# Patient Record
Sex: FEMALE | Race: WHITE | Employment: FULL TIME | URBAN - METROPOLITAN AREA
[De-identification: names, ages, dates, MRNs, and addresses within clinical notes are randomized per-mention and may not be internally consistent; named-entity substitution may affect disease eponyms.]

---

## 2021-10-21 NOTE — PROGRESS NOTES
polyuria. Genitourinary: Negative for dysuria, frequency, vaginal bleeding and vaginal discharge. Musculoskeletal: Negative for arthralgias, back pain, joint swelling, myalgias and neck pain. Skin: Negative for rash and wound. Allergic/Immunologic: Negative for environmental allergies. Neurological: Negative for dizziness, tremors, syncope, weakness, light-headedness, numbness and headaches. Hematological: Negative for adenopathy. Psychiatric/Behavioral: Negative for behavioral problems, decreased concentration, sleep disturbance and suicidal ideas. The patient is not nervous/anxious. History reviewed. No pertinent past medical history. Family History   Problem Relation Age of Onset    Heart Failure Father        Social History     Tobacco Use    Smoking status: Never Smoker    Smokeless tobacco: Never Used   Substance Use Topics    Alcohol use: Not on file    Drug use: Not on file       New Prescriptions    No medications on file       Meds Prior to visit:  No current outpatient medications on file prior to visit. No current facility-administered medications on file prior to visit. No Known Allergies    OBJECTIVE:  BP (!) 148/93   Pulse 121   Temp 97.9 °F (36.6 °C) (Temporal)   Resp 16   Ht 5' 6\" (1.676 m)   Wt 173 lb 9.6 oz (78.7 kg)   LMP 09/22/2021   Breastfeeding No   BMI 28.02 kg/m²   BP Readings from Last 2 Encounters:   10/22/21 (!) 148/93     Wt Readings from Last 3 Encounters:   10/22/21 173 lb 9.6 oz (78.7 kg)       Physical Exam  Vitals reviewed. Constitutional:       General: She is not in acute distress. Appearance: Normal appearance. She is well-developed and normal weight. She is not ill-appearing. HENT:      Head: Normocephalic and atraumatic. Right Ear: Tympanic membrane, ear canal and external ear normal. There is no impacted cerumen. Left Ear: Tympanic membrane, ear canal and external ear normal. There is no impacted cerumen. Nose: Nose normal.      Mouth/Throat:      Mouth: Mucous membranes are moist.      Pharynx: Oropharynx is clear. No oropharyngeal exudate or posterior oropharyngeal erythema. Eyes:      General: No scleral icterus. Right eye: No discharge. Left eye: No discharge. Extraocular Movements: Extraocular movements intact. Conjunctiva/sclera: Conjunctivae normal.      Pupils: Pupils are equal, round, and reactive to light. Cardiovascular:      Rate and Rhythm: Normal rate and regular rhythm. Pulses: Normal pulses. Heart sounds: Normal heart sounds. No murmur heard. Comments: Radial and pedal pulses intact  Pulmonary:      Effort: Pulmonary effort is normal. No respiratory distress. Breath sounds: Normal breath sounds. No wheezing or rales. Chest:      Chest wall: No tenderness. Abdominal:      General: Bowel sounds are normal. There is no distension. Palpations: Abdomen is soft. There is no mass. Tenderness: There is no abdominal tenderness. There is no guarding or rebound. Hernia: No hernia is present. Comments: Normal liver and spleen. No organomegaly   Musculoskeletal:         General: No tenderness. Normal range of motion. Cervical back: Normal range of motion and neck supple. Comments: Intact in all extremities   Lymphadenopathy:      Cervical: No cervical adenopathy. Skin:     General: Skin is warm. Findings: No erythema or rash. Neurological:      General: No focal deficit present. Mental Status: She is alert and oriented to person, place, and time. Mental status is at baseline. Sensory: No sensory deficit. Motor: No weakness or abnormal muscle tone. Coordination: Coordination normal.      Gait: Gait normal.      Deep Tendon Reflexes: Reflexes normal.   Psychiatric:         Mood and Affect: Mood normal.         Behavior: Behavior normal.         Thought Content:  Thought content normal.         Judgment: Judgment normal.           Discussed use, benefit, and side effects of prescribed medications. Barriers to medication compliance addressed. All patient questions answered. Pt voiced understanding. RTC Return in about 1 year (around 10/22/2022).     Future Appointments   Date Time Provider Frida Loredo   12/9/2021  3:30 PM [facility] NIKOLASY AARON Ha MD  10/22/2021  2:31 PM

## 2021-10-22 ENCOUNTER — OFFICE VISIT (OUTPATIENT)
Dept: PRIMARY CARE CLINIC | Age: 31
End: 2021-10-22
Payer: COMMERCIAL

## 2021-10-22 VITALS
HEIGHT: 66 IN | DIASTOLIC BLOOD PRESSURE: 93 MMHG | WEIGHT: 173.6 LBS | BODY MASS INDEX: 27.9 KG/M2 | SYSTOLIC BLOOD PRESSURE: 148 MMHG | HEART RATE: 121 BPM | TEMPERATURE: 97.9 F | RESPIRATION RATE: 16 BRPM

## 2021-10-22 DIAGNOSIS — Z00.00 PHYSICAL EXAM: Primary | ICD-10-CM

## 2021-10-22 DIAGNOSIS — M54.31 RIGHT SCIATIC NERVE PAIN: ICD-10-CM

## 2021-10-22 DIAGNOSIS — Z12.4 CERVICAL CANCER SCREENING: ICD-10-CM

## 2021-10-22 DIAGNOSIS — Z00.00 PHYSICAL EXAM: ICD-10-CM

## 2021-10-22 LAB
A/G RATIO: 1.8 (ref 1.1–2.2)
ALBUMIN SERPL-MCNC: 4.6 G/DL (ref 3.4–5)
ALP BLD-CCNC: 48 U/L (ref 40–129)
ALT SERPL-CCNC: 10 U/L (ref 10–40)
ANION GAP SERPL CALCULATED.3IONS-SCNC: 14 MMOL/L (ref 3–16)
AST SERPL-CCNC: 20 U/L (ref 15–37)
BASOPHILS ABSOLUTE: 0.2 K/UL (ref 0–0.2)
BASOPHILS RELATIVE PERCENT: 2.2 %
BILIRUB SERPL-MCNC: 0.6 MG/DL (ref 0–1)
BUN BLDV-MCNC: 17 MG/DL (ref 7–20)
CALCIUM SERPL-MCNC: 9.3 MG/DL (ref 8.3–10.6)
CHLORIDE BLD-SCNC: 102 MMOL/L (ref 99–110)
CO2: 22 MMOL/L (ref 21–32)
CREAT SERPL-MCNC: 0.9 MG/DL (ref 0.6–1.1)
EOSINOPHILS ABSOLUTE: 0.6 K/UL (ref 0–0.6)
EOSINOPHILS RELATIVE PERCENT: 8.4 %
GFR AFRICAN AMERICAN: >60
GFR NON-AFRICAN AMERICAN: >60
GLOBULIN: 2.5 G/DL
GLUCOSE BLD-MCNC: 80 MG/DL (ref 70–99)
HCT VFR BLD CALC: 40.5 % (ref 36–48)
HEMOGLOBIN: 13.6 G/DL (ref 12–16)
LYMPHOCYTES ABSOLUTE: 1.5 K/UL (ref 1–5.1)
LYMPHOCYTES RELATIVE PERCENT: 19.9 %
MCH RBC QN AUTO: 30.6 PG (ref 26–34)
MCHC RBC AUTO-ENTMCNC: 33.6 G/DL (ref 31–36)
MCV RBC AUTO: 91 FL (ref 80–100)
MONOCYTES ABSOLUTE: 0.4 K/UL (ref 0–1.3)
MONOCYTES RELATIVE PERCENT: 5.4 %
NEUTROPHILS ABSOLUTE: 4.9 K/UL (ref 1.7–7.7)
NEUTROPHILS RELATIVE PERCENT: 64.1 %
PDW BLD-RTO: 13.1 % (ref 12.4–15.4)
PLATELET # BLD: 209 K/UL (ref 135–450)
PMV BLD AUTO: 10.6 FL (ref 5–10.5)
POTASSIUM SERPL-SCNC: 3.9 MMOL/L (ref 3.5–5.1)
RBC # BLD: 4.45 M/UL (ref 4–5.2)
SODIUM BLD-SCNC: 138 MMOL/L (ref 136–145)
TOTAL PROTEIN: 7.1 G/DL (ref 6.4–8.2)
WBC # BLD: 7.7 K/UL (ref 4–11)

## 2021-10-22 PROCEDURE — 99202 OFFICE O/P NEW SF 15 MIN: CPT | Performed by: FAMILY MEDICINE

## 2021-10-22 PROCEDURE — 99385 PREV VISIT NEW AGE 18-39: CPT | Performed by: FAMILY MEDICINE

## 2021-10-22 ASSESSMENT — ENCOUNTER SYMPTOMS
CONSTIPATION: 0
DIARRHEA: 0
SHORTNESS OF BREATH: 0
SINUS PAIN: 0
NAUSEA: 0
COUGH: 0
VOMITING: 0
BLOOD IN STOOL: 0
RHINORRHEA: 0
WHEEZING: 0
BACK PAIN: 0
ABDOMINAL PAIN: 0
CHEST TIGHTNESS: 0
SORE THROAT: 0
SINUS PRESSURE: 0

## 2021-10-22 ASSESSMENT — PATIENT HEALTH QUESTIONNAIRE - PHQ9
SUM OF ALL RESPONSES TO PHQ QUESTIONS 1-9: 0
SUM OF ALL RESPONSES TO PHQ QUESTIONS 1-9: 0
2. FEELING DOWN, DEPRESSED OR HOPELESS: 0
SUM OF ALL RESPONSES TO PHQ9 QUESTIONS 1 & 2: 0
1. LITTLE INTEREST OR PLEASURE IN DOING THINGS: 0
SUM OF ALL RESPONSES TO PHQ QUESTIONS 1-9: 0

## 2021-10-27 ENCOUNTER — HOSPITAL ENCOUNTER (OUTPATIENT)
Dept: PHYSICAL THERAPY | Age: 31
Setting detail: THERAPIES SERIES
Discharge: HOME | End: 2021-10-27
Payer: COMMERCIAL

## 2021-10-27 PROCEDURE — 97161 PT EVAL LOW COMPLEX 20 MIN: CPT | Performed by: PHYSICAL THERAPIST

## 2021-10-27 PROCEDURE — 97110 THERAPEUTIC EXERCISES: CPT | Performed by: PHYSICAL THERAPIST

## 2021-10-27 NOTE — FLOWSHEET NOTE
[facility] [address] [place], [place]    Physical Therapy Treatment Note/ Progress Report:           Date:  10/27/2021    Patient Name:  Sonny Maria    :  1990  MRN: [id number]  Restrictions/Precautions:    Medical/Treatment Diagnosis Information:  · Diagnosis: Right sciatic nerve pain (M54.31)  · Treatment Diagnosis: Low back pain (X33.4)  Insurance/Certification information:  PT Insurance Information: [facility]  Physician Information:  Referring Practitioner: Doreen Fraser  Has the plan of care been signed (Y/N):        []  Yes  [x]  No     Date of Patient follow up with Physician:       Is this a Progress Report:     []  Yes  [x]  No        If Yes:  Date Range for reporting period:  Beginning: 10/27/2021  Ending    Progress report will be due (10 Rx or 30 days whichever is less):        Recertification will be due (POC Duration  / 90 days whichever is less):         Visit # Insurance Allowable Auth Required   In-person 1  []  Yes []  No    Telehealth   []  Yes []  No    Total            Functional Scale: Modified Oswestry 850 (16%)    Date assessed:  10/27/2021      Therapy Diagnosis/Practice Pattern:F      Number of Comorbidities:  [x]0     []1-2    []3+    Latex Allergy:  [x]NO      []YES  Preferred Language for Healthcare:   [x]English       []other:      Pain level:  3-8/10    SUBJECTIVE:  See eval    OBJECTIVE: See eval   Observation:    Test measurements:      RESTRICTIONS/PRECAUTIONS:     Exercises/Interventions:     Therapeutic Ex (34261) Sets/sec Reps Notes/CUES   Pete pose to arGEN-X" 5x ea    Supine outer hip stretch 30\" 3    Figure 4 stretch 30\" 3    HL TA with BKFO 3\" 5x ea  alternating   SLR abd 2 10    Clamshells 3\" 20x    Bridging with ABD 3\" 20x                Patient ed 10'  HEP, POC, ice vs heat, self massage with tennis ball or foam roller, dry needling, posture                           Manual Intervention (75705)      Lumbopelvic roll 3'                                   NMR re-education (70431)   CUES NEEDED                                                         Therapeutic Activity (57614)                                          HEP instruction:   Access Code: [email or web address]  URL: [email or web address]  Date: 10/27/2021  Prepared by: Rimma Gleason    Therapeutic Exercise and NMR EXR  [x] (72438) Provided verbal/tactile cueing for activities related to strengthening, flexibility, endurance, ROM  for improvements in proximal hip and core control with self care, mobility, lifting and ambulation.  [] (61196) Provided verbal/tactile cueing for activities related to improving balance, coordination, kinesthetic sense, posture, motor skill, proprioception  to assist with core control in self care, mobility, lifting, and ambulation.      Therapeutic Activities:    [] (44246 or 79513) Provided verbal/tactile cueing for activities related to improving balance, coordination, kinesthetic sense, posture, motor skill, proprioception and motor activation to allow for proper function  with self care and ADLs  [] (21504) Provided training and instruction to the patient for proper core and proximal hip recruitment and positioning with ambulation re-education     Home Exercise Program:    [x] (24701) Reviewed/Progressed HEP activities related to strengthening, flexibility, endurance, ROM of core, proximal hip and LE for functional self-care, mobility, lifting and ambulation   [] (14548) Reviewed/Progressed HEP activities related to improving balance, coordination, kinesthetic sense, posture, motor skill, proprioception of core, proximal hip and LE for self care, mobility, lifting, and ambulation      Manual Treatments:  PROM / STM / Oscillations-Mobs:  G-I, II, III, IV (PA's, Inf., Post.)  [x] (14360) Provided manual therapy to mobilize proximal hip and LS spine soft tissue/joints for the purpose of modulating pain, promoting relaxation,  increasing ROM, reducing/eliminating soft tissue swelling/inflammation/restriction, improving soft tissue extensibility and allowing for proper ROM for normal function with self care, mobility, lifting and ambulation. Modalities:   None this date    Charges  Timed Code Treatment Minutes: 23'   Total Treatment Minutes: 36'       [x] EVAL (LOW) 05094   [] EVAL (MOD) 83856   [] EVAL (HIGH) 51244   [] RE-EVAL     [x] YC(48113) x  2   [] IONTO  [] NMR (71555) x     [] VASO  [] Manual (95636) x      [] Other:  [] TA x      [] Mech Traction (04876)  [] ES(attended) (79728)      [] ES (un) (88910):     GOALS:  Patient stated goal: \"Increase strength in back/hips\"  [] Progressing: [] Met: [] Not Met: [] Adjusted    Therapist goals for Patient:   Short Term Goals: To be achieved in: 2 weeks  1. Independent in HEP and progression per patient tolerance, in order to prevent re-injury. [] Progressing: [] Met: [] Not Met: [] Adjusted  2. Patient will have a decrease in pain to facilitate improvement in movement, function, and ADLs as indicated by Functional Deficits. [] Progressing: [] Met: [] Not Met: [] Adjusted      Long Term Goals: To be achieved in: 8 weeks  1. Disability index score of 8% or less for the Modified Oswestry to assist with reaching prior level of function. [] Progressing: [] Met: [] Not Met: [] Adjusted  2. Patient will demonstrate increased AROM of lumbar spine flexion and extension to WNL without pain to allow for proper joint functioning as indicated by patients Functional Deficits. [] Progressing: [] Met: [] Not Met: [] Adjusted  3. Patient will demonstrate an increase in Strength in B hip abduction, flexion, and ER to 4+/5 and good core activation to allow for proper functional mobility as indicated by patients Functional Deficits. [] Progressing: [] Met: [] Not Met: [] Adjusted  4. Patient will be able to sit for 2 hours without increased symptoms or restriction.    [] Progressing: [] Met: [] Not Met: [] Adjusted  5. Patient will be able to lift 50# from floor to waist height without increased symptoms or restrictions. (patient specific functional goal)    [] Progressing: [] Met: [] Not Met: [] Adjusted       Progression Towards Functional goals:  [] Patient is progressing as expected towards functional goals listed. [] Progression is slowed due to complexities listed. [] Progression has been slowed due to co-morbidities. [x] Plan just implemented, too soon to assess goals progression  [] Other:     Overall Progression Towards Functional goals/ Treatment Progress Update:  [] Patient is progressing as expected towards functional goals listed. [] Progression is slowed due to complexities/Impairments listed. [] Progression has been slowed due to co-morbidities. [x] Plan just implemented, too soon to assess goals progression <30days   [] Goals require adjustment due to lack of progress  [] Patient is not progressing as expected and requires additional follow up with physician  [] Other    Prognosis for POC: [x] Good [] Fair  [] Poor      Patient requires continued skilled intervention: [x] Yes  [] No    Treatment/Activity Tolerance:  [x] Patient able to complete treatment  [] Patient limited by fatigue  [] Patient limited by pain    [] Patient limited by other medical complications  [] Other:     ASSESSMENT:  See eval                      PLAN: See eval  [] Continue per plan of care [] Alter current plan (see comments above)  [x] Plan of care initiated [] Hold pending MD visit [] Discharge      Electronically signed by:  Leydi Smiley, PT, DPT [id number]     Note: If patient does not return for scheduled/ recommended follow up visits, this note will serve as a discharge from care along with most recent update on progress.

## 2021-10-27 NOTE — PLAN OF CARE
The [address], [address] [facility], [employer] [place], [place]    Physical Therapy Certification    Dear Referring Practitioner: Wendy Sheldon,    We had the pleasure of evaluating the following patient for physical therapy services at [address] [place] [address]. A summary of our findings can be found in the initial assessment below. This includes our plan of care. If you have any questions or concerns regarding these findings, please do not hesitate to contact me at the office phone number checked above. Thank you for the referral.       Physician Signature:_______________________________Date:__________________  By signing above (or electronic signature), therapists plan is approved by physician    Patient: Rasheed Shearer   : 1990   MRN: [id number]  Referring Physician: Referring Practitioner: Wendy Sheldon      Evaluation Date: 10/27/2021      Medical Diagnosis Information:  Diagnosis: Right sciatic nerve pain (M54.31)   Treatment Diagnosis: Low back pain (M54.5)                                         Insurance information: PT Insurance Information: [employer]     Precautions/ Contra-indications:     C-SSRS Triggered by Intake questionnaire (Past 2 wk assessment):   [x] No, Questionnaire did not trigger screening.   [] Yes, Patient intake triggered further evaluation      [] C-SSRS Screening completed  [] PCP notified via Plan of Care  [] Emergency services notified     Latex Allergy:  [x]NO      []YES  Preferred Language for Healthcare:   [x]English       []other:    SUBJECTIVE: Patient stated complaint: Patient reports she has been having pain in her right leg since January with no specific MADINA. Reports that she saw the chiropractor and was doing a lot of things like massage and decompression and that did not help very much. Reports that she moved to [place] in the summer and has since started strength training, which has helped her pain. Pain located in her buttock on the right side. Does not radiate below the knee. Denies N/T.     Relevant Medical History: hx of left foot surgery to correct arch  Functional Disability Index/G-Codes:   Modified Oswestry 8/50 (16%)    Height: 5'7 Weight: 170  Pain Scale: 3-8/10  Easing factors: strength training, ice/heat  Provocative factors: sitting for long periods of time, extending spine, bending over, sleeping on stomach     Type: []Constant   [x]Intermittent  []Radiating []Localized []other:     Numbness/Tingling: denies N/T    Occupation/School:  (Online)      Living Status/Prior Level of Function: Independent with ADLs and IADLs, goes to the gym 3x/wk for strength training and does mobility work 2x/wk  OBJECTIVE:   Standing exam ROM/Normal Abnormal Comments   ROM      flexion 55  Pain on return to neutral   extension 15     side bend x     Kemps/quadrant      Toe walk (S2) x     Heel walk (L4) x     Stork      SLS/SLS with rotation            Standing flexion (PSIS)  x Right side    Standing ext (sacral sulci)      Gillets test        Seated exam ROM/Normal Abnormal Comments   ROM      Trunk rotation      Pelvic height      Seated flexion      Bilat hip IR  x Decreased RLE         Dermatomes      Inguinal area (L1) x     Anterior mid-thigh (L2) x     Distal ant thigh/ med knee (L3)  x     Medial lower leg and foot (L5) x     Lateral lower leg/foot (S1) x     Posterior calf (S1) x     Medial calcaneus (S2) x           Strength/Myotomes      Hip flexion (L1-2)  x     Knee ext (L2-4) x     DF (L4-5) x     Great toe ext (L5)      Ankle eversion (S1-2)      Ankle PF (S1-2) x                             Reflexes      Biceps C5-6      Brachioradialis C6      Triceps C7-8      Patellar tendon (L3-4) Hypo B     Achilles-seated (S1-2) x       Clonus x     Babinski      Chauhans x           Tests Normal Abnormal Comments   Slump test-deg of knee flexion              Supine exam ROM/ Normal Abnormal Comments   ROM      Hip flexion      abduction      Hip IR      Hip ER      Knee ext - flexion      Supine hamstring flexibility      Piriformis flexibility      DEBBIE/Sidney test            Hip scour      SLR x     Crossed SLR x     Supine to sit  x    SI distraction/compression x     Hip thrust        Prone exam ROM/Normal Abnormal Comments   Hip ext ROM      Hip ext strength      Hip IR ROM            PA/spring  x Hypo lower thoracic, upper lumbar   Sacral spring            Prone knee flexion test (innominate) x     Femoral nerve tension (L2-4) x     Achilles reflex/Pheasant test (S1-2) x           Strength  LEFT RIGHT   MfA     TrA Difficulty firing Difficulty firing   HIP Flexors 4/5 4/5   HIP Abductors 4-/5 4/5   HIP ER 4/5 4-/5   Knee EXT (quad) 5/5 4+/5   Knee Flex (HS) 5/5 5/5     Palpation: TTP with TPs noted right piriformis, R ASIS high in standing    Functional Mobility/Transfers: independent    Posture: fair    Bandages/Dressings/Incisions: N/A    Gait: (include devices/WB status) WFL                       [x] Patient history, allergies, meds reviewed. Medical chart reviewed. See intake form. Review Of Systems (ROS):  [x]Performed Review of systems (Integumentary, CardioPulmonary, Neurological) by intake and observation. Intake form has been scanned into medical record. Patient has been instructed to contact their primary care physician regarding ROS issues if not already being addressed at this time.       Co-morbidities/Complexities (which will affect course of rehabilitation):   [x]None           Arthritic conditions   []Rheumatoid arthritis (M05.9)  []Osteoarthritis (M19.91)   Cardiovascular conditions   []Hypertension (I10)  []Hyperlipidemia (E78.5)  []Angina pectoris (I20)  []Atherosclerosis (I70)   Musculoskeletal conditions   []Disc pathology   []Congenital spine pathologies   []Prior surgical intervention  []Osteoporosis (M81.8)  []Osteopenia (M85.8)   Endocrine conditions []Hypothyroid (E03.9)  []Hyperthyroid Gastrointestinal conditions   []Constipation (R49.01)   Metabolic conditions   []Morbid obesity (E66.01)  []Diabetes type 1(E10.65) or 2 (E11.65)   []Neuropathy (G60.9)     Pulmonary conditions   []Asthma (J45)  []Coughing   []COPD (J44.9)   Psychological Disorders  []Anxiety (F41.9)  []Depression (F32.9)   []Other:   []Other:          Barriers to/and or personal factors that will affect rehab potential:              [x]Age  []Sex              []Motivation/Lack of Motivation                        [x]Co-Morbidities              []Cognitive Function, education/learning barriers              []Environmental, home barriers              []profession/work barriers  []past PT/medical experience  []other:  Justification: should progress well    Falls Risk Assessment (30 days):   [x] Falls Risk assessed and no intervention required. [] Falls Risk assessed and Patient requires intervention due to being higher risk   TUG score (>12s at risk):     [] Falls education provided, including     ASSESSMENT: Patient demonstrates a lumbopelvic dysfunction as well as decreased core and hip strength, limiting her ability to sit for long periods of time and perform IADLs without pain. Will benefit from skilled PT to address limitations.     Functional Impairments:     [x]Noted lumbar/proximal hip hypomobility   []Noted lumbosacral and/or generalized hypermobility   [x]Decreased Lumbosacral/hip/LE functional ROM   [x]Decreased core/proximal hip strength and neuromuscular control    [x]Decreased LE functional strength    []Abnormal reflexes/sensation/myotomal/dermatomal deficits  []Reduced balance/proprioceptive control    []other:      Functional Activity Limitations (from functional questionnaire and intake)   [x]Reduced ability to tolerate prolonged functional positions   [x]Reduced ability or difficulty with changes of positions or transfers between positions   []Reduced ability to maintain good posture and demonstrate good body mechanics with sitting, bending, and lifting   []Reduced ability to sleep   [x] Reduced ability or tolerance with driving and/or computer work   [x]Reduced ability to perform lifting, reaching, carrying tasks   []Reduced ability to squat   []Reduced ability to forward bend   []Reduced ability to ambulate prolonged functional periods/distances/surfaces   []Reduced ability to ascend/descend stairs   []other:       Participation Restrictions   [x]Reduced participation in self care activities   [x]Reduced participation in home management activities   [x]Reduced participation in work activities   []Reduced participation in social activities. [x]Reduced participation in sport/recreation activities. Classification:   []Signs/symptoms consistent with Lumbar instability/stabilization subgroup. [x]Signs/symptoms consistent with Lumbar mobilization/manipulation subgroup, myotomes and dermatomes intact. Meets manipulation criteria. []Signs/symptoms consistent with Lumbar direction specific/centralization subgroup   []Signs/symptoms consistent with Lumbar traction subgroup     []Signs/symptoms consistent with lumbar facet dysfunction   []Signs/symptoms consistent with lumbar stenosis type dysfunction   []Signs/symptoms consistent with nerve root involvement including myotome & dermatome dysfunction   []Signs/symptoms consistent with post-surgical status including: decreased ROM, strength and function.    [x]signs/symptoms consistent with pathology which may benefit from Dry needling     []other:      Prognosis/Rehab Potential:      []Excellent   [x]Good    []Fair   []Poor    Tolerance of evaluation/treatment:    []Excellent   [x]Good    []Fair   []Poor  Physical Therapy Evaluation Complexity Justification  [x] A history of present problem with:  [x] no personal factors and/or comorbidities that impact the plan of care;  []1-2 personal factors and/or comorbidities that impact the plan of care  []3 personal factors and/or comorbidities that impact the plan of care  [x] An examination of body systems using standardized tests and measures addressing any of the following: body structures and functions (impairments), activity limitations, and/or participation restrictions;:  [] a total of 1-2 or more elements   [] a total of 3 or more elements   [x] a total of 4 or more elements   [x] A clinical presentation with:  [x] stable and/or uncomplicated characteristics   [] evolving clinical presentation with changing characteristics  [] unstable and unpredictable characteristics;   [x] Clinical decision making of [x] low, [] moderate, [] high complexity using standardized patient assessment instrument and/or measurable assessment of functional outcome. [x] EVAL (LOW) 13812 (typically 20 minutes face-to-face)  [] EVAL (MOD) 07688 (typically 30 minutes face-to-face)  [] EVAL (HIGH) 50760 (typically 45 minutes face-to-face)  [] RE-EVAL         PLAN: Begin PT focusing on: proximal hip mobilizations, LB mobs, LB core activation, proximal hip activation, and HEP    Frequency/Duration:  1-2 days per week for 8 Weeks:  Interventions:  [x]  Therapeutic exercise including: strength training, ROM, for LE, Glutes and core   [x]  NMR activation and proprioception for glutes , LE and Core   [x]  Manual therapy as indicated for Hip complex, LE and spine to include: Dry Needling/IASTM, STM, PROM, Gr I-IV mobilizations, manipulation. [x]  Modalities as needed that may include: thermal agents, E-stim, Biofeedback, US, iontophoresis as indicated  [x]  Patient education on joint protection, postural re-education, activity modification, progression of HEP. HEP instruction:   Access Code: [email or web address]  URL: [email or web address]  Date: 10/27/2021  Prepared by: Taina Campa    GOALS:  Patient stated goal: \"Increase strength in back/hips\"  [] Progressing: [] Met: [] Not Met: [] Adjusted    Therapist goals for Patient:

## 2021-10-29 ENCOUNTER — NURSE ONLY (OUTPATIENT)
Dept: PRIMARY CARE CLINIC | Age: 31
End: 2021-10-29
Payer: COMMERCIAL

## 2021-10-29 VITALS — TEMPERATURE: 98.2 F

## 2021-10-29 PROCEDURE — 90471 IMMUNIZATION ADMIN: CPT | Performed by: FAMILY MEDICINE

## 2021-10-29 PROCEDURE — 90674 CCIIV4 VAC NO PRSV 0.5 ML IM: CPT | Performed by: FAMILY MEDICINE

## 2021-11-03 ENCOUNTER — HOSPITAL ENCOUNTER (OUTPATIENT)
Dept: PHYSICAL THERAPY | Age: 31
Setting detail: THERAPIES SERIES
Discharge: HOME | End: 2021-11-03
Payer: COMMERCIAL

## 2021-11-03 PROCEDURE — 97110 THERAPEUTIC EXERCISES: CPT | Performed by: PHYSICAL THERAPIST

## 2021-11-03 PROCEDURE — 20560 NDL INSJ W/O NJX 1 OR 2 MUSC: CPT | Performed by: PHYSICAL THERAPIST

## 2021-11-03 PROCEDURE — 97140 MANUAL THERAPY 1/> REGIONS: CPT | Performed by: PHYSICAL THERAPIST

## 2021-11-03 NOTE — FLOWSHEET NOTE
[facility], [address] [facility], [address], [place]    Physical Therapy Treatment Note/ Progress Report:           Date:  11/3/2021    Patient Name:  Hiram Russo    :  1990  MRN: [id number]  Restrictions/Precautions:    Medical/Treatment Diagnosis Information:  · Diagnosis: Right sciatic nerve pain (M54.31)  · Treatment Diagnosis: Low back pain (A19.7)  Insurance/Certification information:  PT Insurance Information: [employer]  Physician Information:  Referring Practitioner: Anne Paulino  Has the plan of care been signed (Y/N):        [x]  Yes  []  No     Date of Patient follow up with Physician:       Is this a Progress Report:     []  Yes  [x]  No        If Yes:  Date Range for reporting period:  Beginning: 10/27/2021  Ending    Progress report will be due (10 Rx or 30 days whichever is less): 9965       Recertification will be due (POC Duration  / 90 days whichever is less):         Visit # Insurance Allowable Auth Required   In-person 2  []  Yes []  No    Telehealth   []  Yes []  No    Total            Functional Scale: Modified Oswestry 8/50 (16%)    Date assessed:  10/27/2021      Therapy Diagnosis/Practice Pattern:F      Number of Comorbidities:  [x]0     []1-2    []3+    Latex Allergy:  [x]NO      []YES  Preferred Language for Healthcare:   [x]English       []other:      Pain level:  2-3/10    SUBJECTIVE:  Patient reports she has been doing very well since the last visit. Reports she is sleeping better and having minimal pain. Has been doing the exercises religiously. OBJECTIVE:    Observation:    Test measurements:  NT this date    RESTRICTIONS/PRECAUTIONS:     Exercises/Interventions:   Consent signed 11/3/2021 and precautions addressed. See media tab. Muscle  Needle Size Technique Notes IES   Site 1 Right gluteus medius x 3 (2 prone, 1 SL) . [id number] [x] Pistoning / []  Threading  []  Winding/Coning LTRs []    Site 2 Right TFL x 1 0.4x75mm [x] Pistoning / []  Threading  []  Winding/Coning Small LTR []    Site 3   [] Pistoning / []  Threading  []  Winding/Coning  []    Site 4   [] Pistoning / []  Threading  []  Winding/Coning  []    Site 5   [] Pistoning / []  Threading  []  Winding/Coning  []    Site 6   [] Pistoning / []  Threading  []  Winding/Coning  []    Site 7   [] Pistoning / []  Threading  []  Winding/Coning  []    Site 8   [] Pistoning / []  Threading  []  Winding/Coning  []    Site 9   [] Pistoning / []  Threading  []  Winding/Coning  []    Site 10   [] Pistoning / []  Threading  []  Winding/Coning  []      **The above techniques were used to restore functional range of motion, reduce muscle spasm, and induce healing in the corresponding musculature by means of intramuscular mobilization. Clean Technique was utilized today while applying the Dry needling treatment. The treatment sites where cleaned with 70% solution of isopropyl alcohol. The PT washed their hands and utilized treatment gloves along with hand  prior to inserting the needles. All needles where removed and discarded in the appropriate sharps container. MD has given verbal and/or written approval for this treatment. **        ** Educated patient on anatomy, trigger point etiology, expectations for TDN (bruising, soreness, etc), outcomes, and recommendations for exercise.  **      Therapeutic Ex (11046) Sets/sec Reps Notes/CUES   Patient ed   TDN (see above), self massage, HEP progression   Pete pose to [employer] 5\" 10 x ea       Figure 4 stretch 30\" 3 ea B    HL TA with BKFO 3\" 5x ea  alternating         HL TA with march 3\" 10x ea  alternating   Bridging with ABD 3\" 20x    Supine YTB ext in Tabletop  3\" 20x                LBW GVL  3 laps  12'          Minisquats 3\" 20x          Pallof press 1 15 ea B          Manual Intervention ([id number])      Palpation and assessment of Taut bands and TPs for TDN 3'     STM and TP release to gluteals post TDN 4' Lumbopelvic roll 2'     Lumbar roll 2'     Thoracic spine mobilizations Gr III/IV 4'     Massage stick to IT band 2'           TDN 2'     NMR re-education (02752)   CUES NEEDED                                                         Therapeutic Activity (73439)                                          HEP instruction:   Access Code: [email or web address]  URL: [email or web address]  Date: 10/27/2021  Prepared by: Cally Pierre    Therapeutic Exercise and NMR EXR  [x] (81962) Provided verbal/tactile cueing for activities related to strengthening, flexibility, endurance, ROM  for improvements in proximal hip and core control with self care, mobility, lifting and ambulation.  [] (16692) Provided verbal/tactile cueing for activities related to improving balance, coordination, kinesthetic sense, posture, motor skill, proprioception  to assist with core control in self care, mobility, lifting, and ambulation.      Therapeutic Activities:    [] (83626 or 23472) Provided verbal/tactile cueing for activities related to improving balance, coordination, kinesthetic sense, posture, motor skill, proprioception and motor activation to allow for proper function  with self care and ADLs  [] (38701) Provided training and instruction to the patient for proper core and proximal hip recruitment and positioning with ambulation re-education     Home Exercise Program:    [x] (50072) Reviewed/Progressed HEP activities related to strengthening, flexibility, endurance, ROM of core, proximal hip and LE for functional self-care, mobility, lifting and ambulation   [] (35585) Reviewed/Progressed HEP activities related to improving balance, coordination, kinesthetic sense, posture, motor skill, proprioception of core, proximal hip and LE for self care, mobility, lifting, and ambulation      Manual Treatments:  PROM / STM / Oscillations-Mobs:  G-I, II, III, IV (PA's, Inf., Post.)  [x] (05397) Provided manual therapy to mobilize proximal hip and LS spine soft tissue/joints for the purpose of modulating pain, promoting relaxation,  increasing ROM, reducing/eliminating soft tissue swelling/inflammation/restriction, improving soft tissue extensibility and allowing for proper ROM for normal function with self care, mobility, lifting and ambulation. Modalities:   None this date    Trigger point Dry Needling:  fine needle insertion into myofascial trigger points to stimulate a healing response  [x] (11488) Needle insertion without injection: 1 or 2 muscles  [] (93829) Needle insertion without injection: 3 or more muscles  Charges    Timed Code Treatment Minutes: 42'   Total Treatment Minutes: 40'       [x] EVAL (LOW) 39848   [] EVAL (MOD) 54556   [] EVAL (HIGH) 19464   [] RE-EVAL     [x] FB(11123) x  2   [] IONTO  [] NMR (13776) x     [] VASO  [x] Manual (89689) x  1    [x] Other: TDN 1-2  [] TA x      [] Mech Traction (11260)  [] ES(attended) (49422)      [] ES (un) (70012):     GOALS:  Patient stated goal: \"Increase strength in back/hips\"  [] Progressing: [] Met: [] Not Met: [] Adjusted    Therapist goals for Patient:   Short Term Goals: To be achieved in: 2 weeks  1. Independent in HEP and progression per patient tolerance, in order to prevent re-injury. [] Progressing: [x] Met: [] Not Met: [] Adjusted  2. Patient will have a decrease in pain to facilitate improvement in movement, function, and ADLs as indicated by Functional Deficits. [] Progressing: [x] Met: [] Not Met: [] Adjusted      Long Term Goals: To be achieved in: 8 weeks  1. Disability index score of 8% or less for the Modified Oswestry to assist with reaching prior level of function. [] Progressing: [] Met: [] Not Met: [] Adjusted  2. Patient will demonstrate increased AROM of lumbar spine flexion and extension to WNL without pain to allow for proper joint functioning as indicated by patients Functional Deficits. [] Progressing: [] Met: [] Not Met: [] Adjusted  3.  Patient will demonstrate an current plan (see comments above)  [] Plan of care initiated [] Hold pending MD visit [] Discharge      Electronically signed by:  Janna Ashley PT, DPT [id number]     Note: If patient does not return for scheduled/ recommended follow up visits, this note will serve as a discharge from care along with most recent update on progress.

## 2021-11-08 ENCOUNTER — HOSPITAL ENCOUNTER (OUTPATIENT)
Dept: PHYSICAL THERAPY | Age: 31
Setting detail: THERAPIES SERIES
Discharge: HOME | End: 2021-11-08
Payer: COMMERCIAL

## 2021-11-08 PROCEDURE — 97140 MANUAL THERAPY 1/> REGIONS: CPT | Performed by: PHYSICAL THERAPIST

## 2021-11-08 PROCEDURE — 97110 THERAPEUTIC EXERCISES: CPT | Performed by: PHYSICAL THERAPIST

## 2021-11-08 PROCEDURE — 97032 APPL MODALITY 1+ESTIM EA 15: CPT | Performed by: PHYSICAL THERAPIST

## 2021-11-08 PROCEDURE — 20561 NDL INSJ W/O NJX 3+ MUSC: CPT | Performed by: PHYSICAL THERAPIST

## 2021-11-08 NOTE — FLOWSHEET NOTE
[facility], [address] [facility], [address] [place], [place]    Physical Therapy Treatment Note/ Progress Report:           Date:  2021    Patient Name:  Jocelyn Zendejas    :  1990  MRN: [id number]  Restrictions/Precautions:    Medical/Treatment Diagnosis Information:  · Diagnosis: Right sciatic nerve pain (M54.31)  · Treatment Diagnosis: Low back pain (N03.9)  Insurance/Certification information:  PT Insurance Information: [facility]  Physician Information:  Referring Practitioner: Bradley Ford  Has the plan of care been signed (Y/N):        [x]  Yes  []  No     Date of Patient follow up with Physician:       Is this a Progress Report:     []  Yes  [x]  No        If Yes:  Date Range for reporting period:  Beginning: 10/27/2021  Ending    Progress report will be due (10 Rx or 30 days whichever is less):        Recertification will be due (POC Duration  / 90 days whichever is less):         Visit # Insurance Allowable Auth Required   In-person 3  []  Yes []  No    Telehealth   []  Yes []  No    Total            Functional Scale: Modified Oswestry 8/50 (16%)    Date assessed:  10/27/2021      Therapy Diagnosis/Practice Pattern:F      Number of Comorbidities:  [x]0     []1-2    []3+    Latex Allergy:  [x]NO      []YES  Preferred Language for Healthcare:   [x]English       []other:      Pain level:  6/10 today; 0-3/10 over the weekend    SUBJECTIVE:  Patient reports she is having quite a bit of pain today. Reports she had minimal to no pain since last visit, except for soreness after the dry needling. Reports that she thinks her sleeping position contributed to her pain today. OBJECTIVE:    Observation:    Test measurements:  Standing flexion test +, supine to sit +, seated flexion test+    RESTRICTIONS/PRECAUTIONS:     Exercises/Interventions:   Consent signed 11/3/2021 and precautions addressed. See media tab.    Muscle  Needle Size Technique Notes IES   Site 1 Right piriformis x 1 .[id number] [x] Pistoning / []  Threading  []  Winding/Coning LTRs [x]    Site 2 Right gluteus medius x 1 [id number] [x] Pistoning / []  Threading  []  Winding/Coning Small LTR [x]    Site 3 R L4,5 multifidus  0.3x75mm [] Pistoning / []  Threading  [x]  Winding/Coning  [x]    Site 4   [] Pistoning / []  Threading  []  Winding/Coning  []    Site 5   [] Pistoning / []  Threading  []  Winding/Coning  []    Site 6   [] Pistoning / []  Threading  []  Winding/Coning  []    Site 7   [] Pistoning / []  Threading  []  Winding/Coning  []    Site 8   [] Pistoning / []  Threading  []  Winding/Coning  []    Site 9   [] Pistoning / []  Threading  []  Winding/Coning  []    Site 10   [] Pistoning / []  Threading  []  Winding/Coning  []      **The above techniques were used to restore functional range of motion, reduce muscle spasm, and induce healing in the corresponding musculature by means of intramuscular mobilization. Clean Technique was utilized today while applying the Dry needling treatment. The treatment sites where cleaned with 70% solution of isopropyl alcohol. The PT washed their hands and utilized treatment gloves along with hand  prior to inserting the needles. All needles where removed and discarded in the appropriate sharps container. MD has given verbal and/or written approval for this treatment. **    Attended electrical stimulation was applied in conjunction with dry needling to the sites listed in the chart above to help reduce muscle spasm and interrupt the pain cycle: 10  min at low frequency (1-20Hz), fine frequency (4Hz), low intensity (0-36mA), output  volts. (30471)    ** Educated patient on anatomy, trigger point etiology, expectations for TDN (bruising, soreness, etc), outcomes, and recommendations for exercise.  **      Therapeutic Ex (06921) Sets/sec Reps Notes/CUES   Patient ed   TDN (see above), self massage, HEP progression   Pete pose to endurance, ROM of core, proximal hip and LE for functional self-care, mobility, lifting and ambulation   [] (08719) Reviewed/Progressed HEP activities related to improving balance, coordination, kinesthetic sense, posture, motor skill, proprioception of core, proximal hip and LE for self care, mobility, lifting, and ambulation      Manual Treatments:  PROM / STM / Oscillations-Mobs:  G-I, II, III, IV (PA's, Inf., Post.)  [x] (98215) Provided manual therapy to mobilize proximal hip and LS spine soft tissue/joints for the purpose of modulating pain, promoting relaxation,  increasing ROM, reducing/eliminating soft tissue swelling/inflammation/restriction, improving soft tissue extensibility and allowing for proper ROM for normal function with self care, mobility, lifting and ambulation. Modalities:   None this date    Trigger point Dry Needling:  fine needle insertion into myofascial trigger points to stimulate a healing response  [] (02934) Needle insertion without injection: 1 or 2 muscles  [x] (74551) Needle insertion without injection: 3 or more muscles  Charges    Timed Code Treatment Minutes: 40'   Total Treatment Minutes: 37'       [] EVAL (LOW) 80258   [] EVAL (MOD) 16662   [] EVAL (HIGH) 66324   [] RE-EVAL     [x] TOBIN(21677) x  1   [] IONTO  [] NMR (34512) x     [] VASO  [x] Manual (74275) x  1    [x] Other: TDN 3-4  [] TA x      [] Mech Traction (17102)  [x] ES(attended) (98764)      [] ES (un) (42434):     GOALS:  Patient stated goal: \"Increase strength in back/hips\"  [] Progressing: [] Met: [] Not Met: [] Adjusted    Therapist goals for Patient:   Short Term Goals: To be achieved in: 2 weeks  1. Independent in HEP and progression per patient tolerance, in order to prevent re-injury. [] Progressing: [x] Met: [] Not Met: [] Adjusted  2. Patient will have a decrease in pain to facilitate improvement in movement, function, and ADLs as indicated by Functional Deficits.   [] Progressing: [x] Met: [] Not Met: [] Adjusted      Long Term Goals: To be achieved in: 8 weeks  1. Disability index score of 8% or less for the Modified Oswestry to assist with reaching prior level of function. [] Progressing: [] Met: [] Not Met: [] Adjusted  2. Patient will demonstrate increased AROM of lumbar spine flexion and extension to WNL without pain to allow for proper joint functioning as indicated by patients Functional Deficits. [] Progressing: [] Met: [] Not Met: [] Adjusted  3. Patient will demonstrate an increase in Strength in B hip abduction, flexion, and ER to 4+/5 and good core activation to allow for proper functional mobility as indicated by patients Functional Deficits. [] Progressing: [] Met: [] Not Met: [] Adjusted  4. Patient will be able to sit for 2 hours without increased symptoms or restriction. [] Progressing: [] Met: [] Not Met: [] Adjusted  5. Patient will be able to lift 50# from floor to waist height without increased symptoms or restrictions. (patient specific functional goal)    [] Progressing: [] Met: [] Not Met: [] Adjusted       Progression Towards Functional goals:  [x] Patient is progressing as expected towards functional goals listed. [] Progression is slowed due to complexities listed. [] Progression has been slowed due to co-morbidities. [] Plan just implemented, too soon to assess goals progression  [] Other:     Overall Progression Towards Functional goals/ Treatment Progress Update:  [x] Patient is progressing as expected towards functional goals listed. [] Progression is slowed due to complexities/Impairments listed. [] Progression has been slowed due to co-morbidities.   [] Plan just implemented, too soon to assess goals progression <30days   [] Goals require adjustment due to lack of progress  [] Patient is not progressing as expected and requires additional follow up with physician  [] Other    Prognosis for POC: [x] Good [] Fair  [] Poor      Patient requires continued skilled intervention: [x] Yes  [] No    Treatment/Activity Tolerance:  [x] Patient able to complete treatment  [] Patient limited by fatigue  [] Patient limited by pain    [] Patient limited by other medical complications  [] Other:     ASSESSMENT:  Patient with with cavitation with lumbar roll this date. Responded well to session with reports of soreness rather than the 6/10 pain she started with upon entering PT. Challenged by [facility]. PLAN: See eval  [x] Continue per plan of care [] Alter current plan (see comments above)  [] Plan of care initiated [] Hold pending MD visit [] Discharge      Electronically signed by:  Shreyas Albarado, LIU, DPT [id number]     Note: If patient does not return for scheduled/ recommended follow up visits, this note will serve as a discharge from care along with most recent update on progress.

## 2021-11-10 ENCOUNTER — HOSPITAL ENCOUNTER (OUTPATIENT)
Dept: PHYSICAL THERAPY | Age: 31
Setting detail: THERAPIES SERIES
Discharge: HOME | End: 2021-11-10
Payer: COMMERCIAL

## 2021-11-10 PROCEDURE — 97140 MANUAL THERAPY 1/> REGIONS: CPT | Performed by: PHYSICAL THERAPIST

## 2021-11-10 PROCEDURE — 97110 THERAPEUTIC EXERCISES: CPT | Performed by: PHYSICAL THERAPIST

## 2021-11-10 PROCEDURE — 97112 NEUROMUSCULAR REEDUCATION: CPT | Performed by: PHYSICAL THERAPIST

## 2021-11-10 NOTE — FLOWSHEET NOTE
The [facility] [employer]  Orthopaedics and Sports Rehabilitation, [address] [facility], [address] [place], [place]    Physical Therapy Treatment Note/ Progress Report:           Date:  11/10/2021    Patient Name:  Destiny Herbert    :  1990  MRN: [id number]  Restrictions/Precautions:    Medical/Treatment Diagnosis Information:  · Diagnosis: Right sciatic nerve pain (M54.31)  · Treatment Diagnosis: Low back pain (F73.0)  Insurance/Certification information:  PT Insurance Information: [id number]  Physician Information:  Referring Practitioner: Abel Rosas  Has the plan of care been signed (Y/N):        [x]  Yes  []  No     Date of Patient follow up with Physician:       Is this a Progress Report:     []  Yes  [x]  No        If Yes:  Date Range for reporting period:  Beginning: 10/27/2021  Ending    Progress report will be due (10 Rx or 30 days whichever is less): 6500       Recertification will be due (POC Duration  / 90 days whichever is less):         Visit # Insurance Allowable Auth Required   In-person 4  []  Yes []  No    Telehealth   []  Yes []  No    Total            Functional Scale: Modified Oswestry 8/50 (16%)    Date assessed:  10/27/2021      Therapy Diagnosis/Practice Pattern:F      Number of Comorbidities:  [x]0     []1-2    []3+    Latex Allergy:  [x]NO      []YES  Preferred Language for Healthcare:   [x]English       []other:      Pain level:  0-3/10    SUBJECTIVE:  Patient reports that she is doing better than earlier this week. Reports that she has been able to sleep on her side without rolling onto her stomach, which has been helping. Reports that she has some pain in her lower back on the right side at times, but currently no pain. OBJECTIVE:    Observation:    Test measurements:  Standing flexion test +, supine to sit +, seated flexion test+    RESTRICTIONS/PRECAUTIONS:     Exercises/Interventions:   Consent signed 11/3/2021 and precautions addressed. See media tab.    Muscle Needle Size Technique Notes IES   Site 1 .[id number] [x] Pistoning / []  Threading  []  Winding/Coning LTRs [x]    Site 2 [id number] [x] Pistoning / []  Threading  []  Winding/Coning Small LTR [x]    Site 3 0.3x75mm [] Pistoning / []  Threading  [x]  Winding/Coning  [x]    Site 4   [] Pistoning / []  Threading  []  Winding/Coning  []    Site 5   [] Pistoning / []  Threading  []  Winding/Coning  []    Site 6   [] Pistoning / []  Threading  []  Winding/Coning  []    Site 7   [] Pistoning / []  Threading  []  Winding/Coning  []    Site 8   [] Pistoning / []  Threading  []  Winding/Coning  []    Site 9   [] Pistoning / []  Threading  []  Winding/Coning  []    Site 10   [] Pistoning / []  Threading  []  Winding/Coning  []      **The above techniques were used to restore functional range of motion, reduce muscle spasm, and induce healing in the corresponding musculature by means of intramuscular mobilization. Clean Technique was utilized today while applying the Dry needling treatment. The treatment sites where cleaned with 70% solution of isopropyl alcohol. The PT washed their hands and utilized treatment gloves along with hand  prior to inserting the needles. All needles where removed and discarded in the appropriate sharps container. MD has given verbal and/or written approval for this treatment. **    Attended electrical stimulation was applied in conjunction with dry needling to the sites listed in the chart above to help reduce muscle spasm and interrupt the pain cycle: 10  min at low frequency (1-20Hz), fine frequency (4Hz), low intensity (0-36mA), output  volts. (32388)    ** Educated patient on anatomy, trigger point etiology, expectations for TDN (bruising, soreness, etc), outcomes, and recommendations for exercise.  **      Therapeutic Ex (45036) Sets/sec Reps Notes/CUES   Patient ed   TDN (see above), self massage, HEP progression   Pete pose to [employer] 5\" 10 x ea       Demo for HEP for thoracic mobility   Figure 4 stretch 30\" 3 ea B    Prone TA with hip ext alternating 3\" 10x ea B            alternating         alternating                        LBW GVL above knees 3 laps  12'          Squats with 10# weight 3\" 20x          Pallof press 1 15 ea B          Manual Intervention (18588)          STM and TP release to gluteals  4'          Cavitation noted   Thoracolumbar spine mobilizations Gr III/IV 8'         Long axis hip distraction 2'     TDN 3'     NMR re-education (13155)   CUES NEEDED   Supine YTB in tabletop 3\" 20x    Standing rows staggered stance Blue TB 3\" 10x ea foot forward    Quadruped alternating hip ext 2 5    Quadruped fire hydrants 2 5                                  Therapeutic Activity (98358)                                          HEP instruction:   Access Code: [email or web address]  URL: [email or web address]  Date: 10/27/2021  Prepared by: Ayan Sanchez    Therapeutic Exercise and NMR EXR  [x] (20650) Provided verbal/tactile cueing for activities related to strengthening, flexibility, endurance, ROM  for improvements in proximal hip and core control with self care, mobility, lifting and ambulation.  [] (88504) Provided verbal/tactile cueing for activities related to improving balance, coordination, kinesthetic sense, posture, motor skill, proprioception  to assist with core control in self care, mobility, lifting, and ambulation.      Therapeutic Activities:    [] (13833 or 28751) Provided verbal/tactile cueing for activities related to improving balance, coordination, kinesthetic sense, posture, motor skill, proprioception and motor activation to allow for proper function  with self care and ADLs  [] (92053) Provided training and instruction to the patient for proper core and proximal hip recruitment and positioning with ambulation re-education     Home Exercise Program:    [x] (03311) Reviewed/Progressed HEP activities related to strengthening, flexibility, endurance, ROM of core, proximal hip and LE for functional self-care, mobility, lifting and ambulation   [] (20270) Reviewed/Progressed HEP activities related to improving balance, coordination, kinesthetic sense, posture, motor skill, proprioception of core, proximal hip and LE for self care, mobility, lifting, and ambulation      Manual Treatments:  PROM / STM / Oscillations-Mobs:  G-I, II, III, IV (PA's, Inf., Post.)  [x] (65233) Provided manual therapy to mobilize proximal hip and LS spine soft tissue/joints for the purpose of modulating pain, promoting relaxation,  increasing ROM, reducing/eliminating soft tissue swelling/inflammation/restriction, improving soft tissue extensibility and allowing for proper ROM for normal function with self care, mobility, lifting and ambulation. Modalities:   None this date    Trigger point Dry Needling:  fine needle insertion into myofascial trigger points to stimulate a healing response  [] (58001) Needle insertion without injection: 1 or 2 muscles  [] (72229) Needle insertion without injection: 3 or more muscles  Charges    Timed Code Treatment Minutes: 40'   Total Treatment Minutes: 36'       [] EVAL (LOW) 67558   [] EVAL (MOD) 70493   [] EVAL (HIGH) 24704   [] RE-EVAL     [x] JOYA(30930) x  1  [] IONTO  [x] NMR (93486) x  1   [] VASO  [x] Manual (84021) x  1    [] Other: TDN 3-4  [] TA x      [] Mech Traction (88807)  [] ES(attended) (16861)      [] ES (un) (55201):     GOALS:  Patient stated goal: \"Increase strength in back/hips\"  [] Progressing: [] Met: [] Not Met: [] Adjusted    Therapist goals for Patient:   Short Term Goals: To be achieved in: 2 weeks  1. Independent in HEP and progression per patient tolerance, in order to prevent re-injury. [] Progressing: [x] Met: [] Not Met: [] Adjusted  2. Patient will have a decrease in pain to facilitate improvement in movement, function, and ADLs as indicated by Functional Deficits.   [] Progressing: [x] Met: [] Not Met: [] Adjusted      Long Term Goals: To be achieved in: 8 weeks  1. Disability index score of 8% or less for the Modified Oswestry to assist with reaching prior level of function. [] Progressing: [] Met: [] Not Met: [] Adjusted  2. Patient will demonstrate increased AROM of lumbar spine flexion and extension to WNL without pain to allow for proper joint functioning as indicated by patients Functional Deficits. [] Progressing: [] Met: [] Not Met: [] Adjusted  3. Patient will demonstrate an increase in Strength in B hip abduction, flexion, and ER to 4+/5 and good core activation to allow for proper functional mobility as indicated by patients Functional Deficits. [] Progressing: [] Met: [] Not Met: [] Adjusted  4. Patient will be able to sit for 2 hours without increased symptoms or restriction. [] Progressing: [] Met: [] Not Met: [] Adjusted  5. Patient will be able to lift 50# from floor to waist height without increased symptoms or restrictions. (patient specific functional goal)    [] Progressing: [] Met: [] Not Met: [] Adjusted       Progression Towards Functional goals:  [x] Patient is progressing as expected towards functional goals listed. [] Progression is slowed due to complexities listed. [] Progression has been slowed due to co-morbidities. [] Plan just implemented, too soon to assess goals progression  [] Other:     Overall Progression Towards Functional goals/ Treatment Progress Update:  [x] Patient is progressing as expected towards functional goals listed. [] Progression is slowed due to complexities/Impairments listed. [] Progression has been slowed due to co-morbidities.   [] Plan just implemented, too soon to assess goals progression <30days   [] Goals require adjustment due to lack of progress  [] Patient is not progressing as expected and requires additional follow up with physician  [] Other    Prognosis for POC: [x] Good [] Fair  [] Poor      Patient requires continued skilled intervention: [x] Yes  [] No    Treatment/Activity Tolerance:  [x] Patient able to complete treatment  [] Patient limited by fatigue  [] Patient limited by pain    [] Patient limited by other medical complications  [] Other:     ASSESSMENT:  Patient with less pain and irritation today. Continues to demonstrate tightness in thoracic and upper lumbar spine. No pain with higher level strengthening today. PLAN: See eval  [x] Continue per plan of care [] Alter current plan (see comments above)  [] Plan of care initiated [] Hold pending MD visit [] Discharge    Electronically signed by:  Richelle Ivy, PT, DPT [id number]     Note: If patient does not return for scheduled/ recommended follow up visits, this note will serve as a discharge from care along with most recent update on progress.

## 2021-11-17 ENCOUNTER — HOSPITAL ENCOUNTER (OUTPATIENT)
Dept: PHYSICAL THERAPY | Age: 31
Setting detail: THERAPIES SERIES
Discharge: HOME | End: 2021-11-17
Payer: COMMERCIAL

## 2021-11-17 PROCEDURE — 97140 MANUAL THERAPY 1/> REGIONS: CPT | Performed by: PHYSICAL THERAPIST

## 2021-11-17 PROCEDURE — 97110 THERAPEUTIC EXERCISES: CPT | Performed by: PHYSICAL THERAPIST

## 2021-11-17 PROCEDURE — 97032 APPL MODALITY 1+ESTIM EA 15: CPT | Performed by: PHYSICAL THERAPIST

## 2021-11-17 PROCEDURE — 20560 NDL INSJ W/O NJX 1 OR 2 MUSC: CPT | Performed by: PHYSICAL THERAPIST

## 2021-11-17 NOTE — FLOWSHEET NOTE
The [facility] [employer]  Orthopaedics and Sports Rehabilitation, [address] [facility], [address] [place], [place]    Physical Therapy Treatment Note/ Progress Report:           Date:  2021    Patient Name:  Saran Baker    :  1990  MRN: [id number]  Restrictions/Precautions:    Medical/Treatment Diagnosis Information:  · Diagnosis: Right sciatic nerve pain (M54.31)  · Treatment Diagnosis: Low back pain (K43.4)  Insurance/Certification information:  PT Insurance Information: [employer]  Physician Information:  Referring Practitioner: Hunter Hobbs  Has the plan of care been signed (Y/N):        [x]  Yes  []  No     Date of Patient follow up with Physician:       Is this a Progress Report:     []  Yes  [x]  No        If Yes:  Date Range for reporting period:  Beginning: 10/27/2021  Ending    Progress report will be due (10 Rx or 30 days whichever is less):        Recertification will be due (POC Duration  / 90 days whichever is less):         Visit # Insurance Allowable Auth Required   In-person 4  []  Yes []  No    Telehealth   []  Yes []  No    Total            Functional Scale: Modified Oswestry 8/50 (16%)    Date assessed:  10/27/2021      Therapy Diagnosis/Practice Pattern:F      Number of Comorbidities:  [x]0     []1-2    []3+    Latex Allergy:  [x]NO      []YES  Preferred Language for Healthcare:   [x]English       []other:      Pain level:  4-7/10    SUBJECTIVE:  Patient reports she was feeling great last week after PT. Had a massage Friday and on Saturday she felt great.  she woke up with a lot of pain, but it seems to be in a different spot.  Seems to be more on the side of her hip and down the outside    OBJECTIVE:    Observation: TTP at greater trochanter and just below, palpable tightness along IT band   Test measurements:  Standing flexion test + R, supine to sit + R, seated flexion test+ R    RESTRICTIONS/PRECAUTIONS:     Exercises/Interventions:   Consent signed 11/3/2021 and precautions addressed. See media tab. Muscle  Needle Size Technique Notes IES   Site 1  .[id number] [x] Pistoning / []  Threading  []  Winding/Coning LTRs []    Site 2 Right gluteus medius x 2 0.7l922dz [x] Pistoning / []  Threading  [x]  Winding/Coning LTR [x]    Site 3   0.3x75mm [] Pistoning / []  Threading  [x]  Winding/Coning  []    Site 4 Right VL x 4 0.3x50mm [x] Pistoning / []  Threading  [x]  Winding/Coning LTR []    Site 5   [] Pistoning / []  Threading  []  Winding/Coning  []    Site 6   [] Pistoning / []  Threading  []  Winding/Coning  []    Site 7   [] Pistoning / []  Threading  []  Winding/Coning  []    Site 8   [] Pistoning / []  Threading  []  Winding/Coning  []    Site 9   [] Pistoning / []  Threading  []  Winding/Coning  []    Site 10   [] Pistoning / []  Threading  []  Winding/Coning  []      **The above techniques were used to restore functional range of motion, reduce muscle spasm, and induce healing in the corresponding musculature by means of intramuscular mobilization. Clean Technique was utilized today while applying the Dry needling treatment. The treatment sites where cleaned with 70% solution of isopropyl alcohol. The PT washed their hands and utilized treatment gloves along with hand  prior to inserting the needles. All needles where removed and discarded in the appropriate sharps container. MD has given verbal and/or written approval for this treatment. **    Attended electrical stimulation was applied in conjunction with dry needling to the sites listed in the chart above to help reduce muscle spasm and interrupt the pain cycle: 10  min at low frequency (1-20Hz), fine frequency (4Hz), low intensity (0-36mA), output  volts. (68417)    ** Educated patient on anatomy, trigger point etiology, expectations for TDN (bruising, soreness, etc), outcomes, and recommendations for exercise.  **      Therapeutic Ex (04182) Sets/sec Reps Notes/CUES   Patient ed 5'  Self massage, posture, hip vs lumbar pathology   Dossie Gila pose to [employer] 5\" 10 x ea       Demo for HEP for thoracic mobility   Figure 4 stretch 30\" 3 ea B               alternating         alternating            Incline 30\" 3          LBW GVL below knees 2 laps  15'    Monster walks GVL below knees 2 laps  15'                      Manual Intervention (41703)      Palpation and assessment of Taut bands and TPs for TDN 3'     STM and TP release to gluteals post TDN 4'     Lumbopelvic roll 2'  No cavitation noted    Cavitation noted           Long axis hip distraction 2'     TDN 3'     NMR re-education (00508)   CUES NEEDED         Quadruped alternating hip ext 2 5    Quadruped fire hydrants 2 5                                  Therapeutic Activity (64563)                                          HEP instruction:   Access Code: [email or web address]  URL: [email or web address]  Date: 10/27/2021  Prepared by: Lashay Zacarias    Therapeutic Exercise and NMR EXR  [x] (61968) Provided verbal/tactile cueing for activities related to strengthening, flexibility, endurance, ROM  for improvements in proximal hip and core control with self care, mobility, lifting and ambulation.  [] (76903) Provided verbal/tactile cueing for activities related to improving balance, coordination, kinesthetic sense, posture, motor skill, proprioception  to assist with core control in self care, mobility, lifting, and ambulation.      Therapeutic Activities:    [] (19460 or 46851) Provided verbal/tactile cueing for activities related to improving balance, coordination, kinesthetic sense, posture, motor skill, proprioception and motor activation to allow for proper function  with self care and ADLs  [] (06549) Provided training and instruction to the patient for proper core and proximal hip recruitment and positioning with ambulation re-education     Home Exercise Program:    [x] (95132) Reviewed/Progressed HEP activities related to strengthening, flexibility, endurance, ROM of core, proximal hip and LE for functional self-care, mobility, lifting and ambulation   [] (67736) Reviewed/Progressed HEP activities related to improving balance, coordination, kinesthetic sense, posture, motor skill, proprioception of core, proximal hip and LE for self care, mobility, lifting, and ambulation      Manual Treatments:  PROM / STM / Oscillations-Mobs:  G-I, II, III, IV (PA's, Inf., Post.)  [x] (76270) Provided manual therapy to mobilize proximal hip and LS spine soft tissue/joints for the purpose of modulating pain, promoting relaxation,  increasing ROM, reducing/eliminating soft tissue swelling/inflammation/restriction, improving soft tissue extensibility and allowing for proper ROM for normal function with self care, mobility, lifting and ambulation. Modalities:   None this date    Trigger point Dry Needling:  fine needle insertion into myofascial trigger points to stimulate a healing response  [x] (37428) Needle insertion without injection: 1 or 2 muscles  [] (17047) Needle insertion without injection: 3 or more muscles  Charges    Timed Code Treatment Minutes: 40'   Total Treatment Minutes: 37'       [] EVAL (LOW) 35628   [] EVAL (MOD) 97287   [] EVAL (HIGH) 38745   [] RE-EVAL     [x] HC(23905) x  1  [] IONTO  [] NMR (86708) x     [] VASO  [x] Manual (63558) x  1    [x] Other: TDN 1-2  [] TA x      [] Mech Traction (84242)  [x] ES(attended) (54161)      [] ES (un) (58498):     GOALS:  Patient stated goal: \"Increase strength in back/hips\"  [] Progressing: [] Met: [] Not Met: [] Adjusted    Therapist goals for Patient:   Short Term Goals: To be achieved in: 2 weeks  1. Independent in HEP and progression per patient tolerance, in order to prevent re-injury. [] Progressing: [x] Met: [] Not Met: [] Adjusted  2. Patient will have a decrease in pain to facilitate improvement in movement, function, and ADLs as indicated by Functional Deficits.   [] Progressing: [x] Met: [] Not Met: [] Adjusted      Long Term Goals: To be achieved in: 8 weeks  1. Disability index score of 8% or less for the Modified Oswestry to assist with reaching prior level of function. [] Progressing: [] Met: [] Not Met: [] Adjusted  2. Patient will demonstrate increased AROM of lumbar spine flexion and extension to WNL without pain to allow for proper joint functioning as indicated by patients Functional Deficits. [] Progressing: [] Met: [] Not Met: [] Adjusted  3. Patient will demonstrate an increase in Strength in B hip abduction, flexion, and ER to 4+/5 and good core activation to allow for proper functional mobility as indicated by patients Functional Deficits. [] Progressing: [] Met: [] Not Met: [] Adjusted  4. Patient will be able to sit for 2 hours without increased symptoms or restriction. [] Progressing: [] Met: [] Not Met: [] Adjusted  5. Patient will be able to lift 50# from floor to waist height without increased symptoms or restrictions. (patient specific functional goal)    [] Progressing: [] Met: [] Not Met: [] Adjusted       Progression Towards Functional goals:  [x] Patient is progressing as expected towards functional goals listed. [] Progression is slowed due to complexities listed. [] Progression has been slowed due to co-morbidities. [] Plan just implemented, too soon to assess goals progression  [] Other:     Overall Progression Towards Functional goals/ Treatment Progress Update:  [x] Patient is progressing as expected towards functional goals listed. [] Progression is slowed due to complexities/Impairments listed. [] Progression has been slowed due to co-morbidities.   [] Plan just implemented, too soon to assess goals progression <30days   [] Goals require adjustment due to lack of progress  [] Patient is not progressing as expected and requires additional follow up with physician  [] Other    Prognosis for POC: [x] Good [] Fair  [] Poor      Patient requires continued skilled

## 2021-11-19 ENCOUNTER — OFFICE VISIT (OUTPATIENT)
Dept: OBGYN CLINIC | Age: 31
End: 2021-11-19
Payer: COMMERCIAL

## 2021-11-19 VITALS
BODY MASS INDEX: 24.57 KG/M2 | HEIGHT: 70 IN | WEIGHT: 171.6 LBS | HEART RATE: 114 BPM | TEMPERATURE: 97.4 F | SYSTOLIC BLOOD PRESSURE: 114 MMHG | DIASTOLIC BLOOD PRESSURE: 66 MMHG

## 2021-11-19 DIAGNOSIS — Z12.39 SCREENING BREAST EXAMINATION: ICD-10-CM

## 2021-11-19 DIAGNOSIS — Z30.09 FAMILY PLANNING: ICD-10-CM

## 2021-11-19 DIAGNOSIS — Z12.4 PAP SMEAR FOR CERVICAL CANCER SCREENING: ICD-10-CM

## 2021-11-19 DIAGNOSIS — Z01.419 WOMEN'S ANNUAL ROUTINE GYNECOLOGICAL EXAMINATION: Primary | ICD-10-CM

## 2021-11-19 PROCEDURE — 99385 PREV VISIT NEW AGE 18-39: CPT | Performed by: OBSTETRICS & GYNECOLOGY

## 2021-11-19 NOTE — PROGRESS NOTES
[facility]   Annual Exam        CC:   Chief Complaint   Patient presents with    New Patient    Gynecologic Exam       HPI:  35 y.o. Toi Zaldivar presents for her gynecologic annual exam.  New to office. Doing well. Interested in conception soon, same sex marriage. Health Maintenance:  Safe Enviroment: y  Sexually Active: y   Same sex relationship   Interested in pregnancy in 2-3 yrs    n sexual problems  Contraception: n    Screening:  Last pap smear: 2018 or 2017   History of abnormal pap smears: normal  STI History: denies       Review of Systems:   Constitutional: Negative for appetite change, chills and fever. HENT: Negative for congestion, sneezing and sore throat. Eyes: Negative for visual disturbance. Respiratory: Negative for chest tightness, shortness of breath and wheezing. Cardiovascular: Negative for chest pain, palpitations and leg swelling. Gastrointestinal: Negative for abdominal pain, diarrhea, nausea and vomiting. Endocrine: Negative for heat intolerance. Genitourinary: Negative for difficulty urinating, dyspareunia, dysuria, menstrual problem and pelvic pain. Musculoskeletal: Negative for back pain, neck pain and neck stiffness. Skin: Negative for color change, pallor and rash. Allergic/Immunologic: Negative for environmental allergies, food allergies and immunocompromised state. Neurological: Negative for light-headedness, numbness and headaches. Hematological: Does not bruise/bleed easily. Psychiatric/Behavioral: Negative for behavioral problems, sleep disturbance and suicidal ideas. Primary Care Physician: Wei Castro MD    Obstetric History  OB History    Para Term  AB Living   0 0 0 0 0 0   SAB IAB Ectopic Molar Multiple Live Births   0 0 0 0 0 0       Gynecologic History  Menstrual History:   Menarche: 16-15 yoa   LMP: Patient's last menstrual period was 10/24/2021 (exact date).     Menstrual Period: regular   Interval Between Menses: 30d   Duration of Menses: 4-5d   Menstrual Flow: normal   Bleeding between menses: denies   Pain: deniea     MedicalHistory:  History reviewed. No pertinent past medical history. Medications:  No current outpatient medications on file. No current facility-administered medications for this visit. Surgical History:  Past Surgical History:   Procedure Laterality Date    FOOT SURGERY Left     FOOT SURGERY Right        Allergies:  No Known Allergies    Family History:  Family History   Problem Relation Age of Onset    Heart Failure Father        Social History:  Social History     Socioeconomic History    Marital status: Single     Spouse name: None    Number of children: None    Years of education: None    Highest education level: None   Occupational History    None   Tobacco Use    Smoking status: Never Smoker    Smokeless tobacco: Never Used   Vaping Use    Vaping Use: Never used   Substance and Sexual Activity    Alcohol use: Not Currently    Drug use: Yes     Types: Marijuana Tj Abraham)     Comment: medical marijuana    Sexual activity: Yes     Partners: Female   Other Topics Concern    None   Social History Narrative    None     Social Determinants of Health     Financial Resource Strain:     Difficulty of Paying Living Expenses: Not on file   Food Insecurity:     Worried About Running Out of Food in the Last Year: Not on file    Lisa of Food in the Last Year: Not on file   Transportation Needs:     Lack of Transportation (Medical): Not on file    Lack of Transportation (Non-Medical):  Not on file   Physical Activity:     Days of Exercise per Week: Not on file    Minutes of Exercise per Session: Not on file   Stress:     Feeling of Stress : Not on file   Social Connections:     Frequency of Communication with Friends and Family: Not on file    Frequency of Social Gatherings with Friends and Family: Not on file    Attends [employer] Services: Not on file   Radha Bailey Member of Clubs or Organizations: Not on file    Attends Club or Organization Meetings: Not on file    Marital Status: Not on file   Intimate Partner Violence:     Fear of Current or Ex-Partner: Not on file    Emotionally Abused: Not on file    Physically Abused: Not on file    Sexually Abused: Not on file   Housing Stability:     Unable to Pay for Housing in the Last Year: Not on file    Number of [place] in the Last Year: Not on file    Unstable Housing in the Last Year: Not on file       Objective: Body mass index is 24.62 kg/m². /66 (Site: Left Upper Arm, Position: Sitting, Cuff Size: Medium Adult)   Pulse 114   Temp 97.4 °F (36.3 °C) (Temporal)   Ht 5' 10\" (1.778 m)   Wt 171 lb 9.6 oz (77.8 kg)   LMP 10/24/2021 (Exact Date)   BMI 24.62 kg/m²     Exam:   General: Alert, well appearing, no acute distress  Head: Normocephalic, atraumatic  Mouth: Mucous membranes moist, pharynx normal without lesions  Thyroid: No thyromegaly or masses present   Breasts: Symmetric, non-tender to palpation, no skin changes, palpable axillary lymph nodes or masses noted  Lungs: Respiratory effort within normal limits   CV: Regular rate   Abdomen: Soft, nontender, nondistended   Pelvic:   External: Normal appearing genitalia without masses, tenderness or lesions  Vagina: moist, pink mucosa, without abnormal discharge or lesions  Cervix: no masses or lesions visualized, no cervical motion tenderness  Uterus: mobile, midline, no masses palpated  Adnexa: mobile, non-tender to palpation, without masses  Rectovaginal: deferred  Extremities: No redness or tenderness, neg Ralph's sign  Skin: Well perfused, normal coloration and turgor, no lesions or rashes visualized  Neuro: Alert, oriented, normal speech, no focal deficits, moves extremities appropriately  Osteopathic: No TART changes    Assessment/Plan:  35 y.o. Muriel De La Cruz presenting for her annual exam:     Diagnosis Orders   1.  Women's annual routine gynecological examination     2. Pap smear for cervical cancer screening  PAP SMEAR   3. Screening breast examination     4. Family planning          Annual Visit Recommendations:   Self-breast exam and self-breast awareness reviewed. Pelvic exam was done and ASCCP guidelines reviewed   Reviewed healthy diet and daily multivitamin use. Reviewed recommendations on Calcium and Vitamin D intake. Discussed seatbelt use. Follow Up  - Will call patient with results   -Return in about 1 year (around 11/19/2022) for Annual or sooner if needed.     Heladio Jules,

## 2021-11-22 ENCOUNTER — HOSPITAL ENCOUNTER (OUTPATIENT)
Dept: PHYSICAL THERAPY | Age: 31
Setting detail: THERAPIES SERIES
Discharge: HOME | End: 2021-11-22
Payer: COMMERCIAL

## 2021-11-22 PROCEDURE — 97110 THERAPEUTIC EXERCISES: CPT | Performed by: PHYSICAL THERAPIST

## 2021-11-22 PROCEDURE — 97112 NEUROMUSCULAR REEDUCATION: CPT | Performed by: PHYSICAL THERAPIST

## 2021-11-22 PROCEDURE — 97140 MANUAL THERAPY 1/> REGIONS: CPT | Performed by: PHYSICAL THERAPIST

## 2021-11-22 NOTE — FLOWSHEET NOTE
[facility] [employer]  Orthopaedics and Sports Rehabilitation, [address] [facility], [address], [place]    Physical Therapy Treatment Note/ Progress Report:           Date:  2021    Patient Name:  Manda Villa    :  1990  MRN: [id number]  Restrictions/Precautions:    Medical/Treatment Diagnosis Information:  · Diagnosis: Right sciatic nerve pain (M54.31)  · Treatment Diagnosis: Low back pain (Z89.7)  Insurance/Certification information:  PT Insurance Information: [id number]  Physician Information:  Referring Practitioner: Yessy Amos  Has the plan of care been signed (Y/N):        [x]  Yes  []  No     Date of Patient follow up with Physician:       Is this a Progress Report:     []  Yes  [x]  No        If Yes:  Date Range for reporting period:  Beginning: 10/27/2021  Ending    Progress report will be due (10 Rx or 30 days whichever is less):        Recertification will be due (POC Duration  / 90 days whichever is less):         Visit # Insurance Allowable Auth Required   In-person 5  []  Yes []  No    Telehealth   []  Yes []  No    Total            Functional Scale: Modified Oswestry 8/50 (16%)    Date assessed:  10/27/2021      Therapy Diagnosis/Practice Pattern:F      Number of Comorbidities:  [x]0     []1-2    []3+    Latex Allergy:  [x]NO      []YES  Preferred Language for Healthcare:   [x]English       []other:      Pain level:  0-1/10    SUBJECTIVE:  Patient reports this is the best she has felt in a year. Felt great after last session. OBJECTIVE:    Observation: TTP at greater trochanter and just below, palpable tightness along IT band   Test measurements:  Standing flexion test -, supine to sit + R, prone flexion - R    RESTRICTIONS/PRECAUTIONS:     Exercises/Interventions:   Consent signed 11/3/2021 and precautions addressed. See media tab.    Muscle  Needle Size Technique Notes IES   Site 1  .[id number] [x] Pistoning / []  Threading  []  Winding/Coning LTRs []    Site 2 0.2q844kl [x] Pistoning / []  Threading  [x]  Winding/Coning LTR [x]    Site 3 0.3x75mm [] Pistoning / []  Threading  [x]  Winding/Coning  []    Site 4 0.3x50mm [x] Pistoning / []  Threading  [x]  Winding/Coning LTR []    Site 5   [] Pistoning / []  Threading  []  Winding/Coning  []    Site 6   [] Pistoning / []  Threading  []  Winding/Coning  []    Site 7   [] Pistoning / []  Threading  []  Winding/Coning  []    Site 8   [] Pistoning / []  Threading  []  Winding/Coning  []    Site 9   [] Pistoning / []  Threading  []  Winding/Coning  []    Site 10   [] Pistoning / []  Threading  []  Winding/Coning  []            Therapeutic Ex (71670) Sets/sec Reps Notes/CUES   Patient ed 5'  Self massage, posture, hip vs lumbar pathology         Demo for HEP for thoracic mobility   Figure 4 stretch 30\" 3 ea B               alternating         alternating            Incline stretch 30\" 3                Goblet squats 20# 3\" 20x          Pallof press GTB x 2 1 15 ea B    Glider posterior reach 1 15x ea B    Manual Intervention (93412)          STM and TP release to gluteals and piriformis  4'      No cavitation noted    Cavitation noted   Thoracolumbar spine mobilizations Gr III/IV 8'     Massage stick to IT band 2'         TDN 3'     NMR re-education (62468)   CUES NEEDED         Quadruped alternating bird dog 2 5 ea    Quadruped fire hydrants 2 5    Planks  30\" 2    Anterior hip thrust in tall kneeling on BOSU with purple band 2 10                      Therapeutic Activity (19917)                                          HEP instruction:   Access Code: [email or web address]  URL: [email or web address]  Date: 10/27/2021  Prepared by: Thelma Rodriguez    Therapeutic Exercise and NMR EXR  [x] (93997) Provided verbal/tactile cueing for activities related to strengthening, flexibility, endurance, ROM  for improvements in proximal hip and core control with self care, mobility, lifting and ambulation.  [] (73195) Provided verbal/tactile cueing for activities related to improving balance, coordination, kinesthetic sense, posture, motor skill, proprioception  to assist with core control in self care, mobility, lifting, and ambulation. Therapeutic Activities:    [] (24546 or 41489) Provided verbal/tactile cueing for activities related to improving balance, coordination, kinesthetic sense, posture, motor skill, proprioception and motor activation to allow for proper function  with self care and ADLs  [] (91989) Provided training and instruction to the patient for proper core and proximal hip recruitment and positioning with ambulation re-education     Home Exercise Program:    [x] (74982) Reviewed/Progressed HEP activities related to strengthening, flexibility, endurance, ROM of core, proximal hip and LE for functional self-care, mobility, lifting and ambulation   [] (52880) Reviewed/Progressed HEP activities related to improving balance, coordination, kinesthetic sense, posture, motor skill, proprioception of core, proximal hip and LE for self care, mobility, lifting, and ambulation      Manual Treatments:  PROM / STM / Oscillations-Mobs:  G-I, II, III, IV (PA's, Inf., Post.)  [x] (32189) Provided manual therapy to mobilize proximal hip and LS spine soft tissue/joints for the purpose of modulating pain, promoting relaxation,  increasing ROM, reducing/eliminating soft tissue swelling/inflammation/restriction, improving soft tissue extensibility and allowing for proper ROM for normal function with self care, mobility, lifting and ambulation.      Modalities:   None this date    Trigger point Dry Needling:  fine needle insertion into myofascial trigger points to stimulate a healing response  [] (85847) Needle insertion without injection: 1 or 2 muscles  [] (62570) Needle insertion without injection: 3 or more muscles  Charges    Timed Code Treatment Minutes: 40'   Total Treatment Minutes: 40'       [] EVAL (LOW) 90920   [] EVAL (MOD) 08492   [] EVAL (HIGH) 98044 [] RE-EVAL     [x] ND(97629) x  1  [] IONTO  [x] NMR (02768) x 1  [] VASO  [x] Manual (25559) x  1    [] Other: TDN 1-2  [] TA x      [] Mech Traction (54519)  [] ES(attended) (69050)      [] ES (un) (79007):     GOALS:  Patient stated goal: \"Increase strength in back/hips\"  [] Progressing: [] Met: [] Not Met: [] Adjusted    Therapist goals for Patient:   Short Term Goals: To be achieved in: 2 weeks  1. Independent in HEP and progression per patient tolerance, in order to prevent re-injury. [] Progressing: [x] Met: [] Not Met: [] Adjusted  2. Patient will have a decrease in pain to facilitate improvement in movement, function, and ADLs as indicated by Functional Deficits. [] Progressing: [x] Met: [] Not Met: [] Adjusted      Long Term Goals: To be achieved in: 8 weeks  1. Disability index score of 8% or less for the Modified Oswestry to assist with reaching prior level of function. [] Progressing: [] Met: [] Not Met: [] Adjusted  2. Patient will demonstrate increased AROM of lumbar spine flexion and extension to WNL without pain to allow for proper joint functioning as indicated by patients Functional Deficits. [] Progressing: [] Met: [] Not Met: [] Adjusted  3. Patient will demonstrate an increase in Strength in B hip abduction, flexion, and ER to 4+/5 and good core activation to allow for proper functional mobility as indicated by patients Functional Deficits. [] Progressing: [] Met: [] Not Met: [] Adjusted  4. Patient will be able to sit for 2 hours without increased symptoms or restriction. [] Progressing: [x] Met: [] Not Met: [] Adjusted  5. Patient will be able to lift 50# from floor to waist height without increased symptoms or restrictions. (patient specific functional goal)    [] Progressing: [] Met: [] Not Met: [] Adjusted       Progression Towards Functional goals:  [x] Patient is progressing as expected towards functional goals listed.     [] Progression is slowed due to complexities listed. [] Progression has been slowed due to co-morbidities. [] Plan just implemented, too soon to assess goals progression  [] Other:     Overall Progression Towards Functional goals/ Treatment Progress Update:  [x] Patient is progressing as expected towards functional goals listed. [] Progression is slowed due to complexities/Impairments listed. [] Progression has been slowed due to co-morbidities. [] Plan just implemented, too soon to assess goals progression <30days   [] Goals require adjustment due to lack of progress  [] Patient is not progressing as expected and requires additional follow up with physician  [] Other    Prognosis for POC: [x] Good [] Fair  [] Poor      Patient requires continued skilled intervention: [x] Yes  [] No    Treatment/Activity Tolerance:  [x] Patient able to complete treatment  [] Patient limited by fatigue  [] Patient limited by pain    [] Patient limited by other medical complications  [] Other:     ASSESSMENT:  Patient doing well today, with mild tightness and irritation in her back and hip. Challenged with higher level strengthening today. Progressing very well at this time. PLAN: See eval  [x] Continue per plan of care [] Alter current plan (see comments above)  [] Plan of care initiated [] Hold pending MD visit [] Discharge    Electronically signed by:  Joe Gomez PT, DPT [id number]     Note: If patient does not return for scheduled/ recommended follow up visits, this note will serve as a discharge from care along with most recent update on progress.

## 2021-11-24 ENCOUNTER — HOSPITAL ENCOUNTER (OUTPATIENT)
Dept: PHYSICAL THERAPY | Age: 31
Setting detail: THERAPIES SERIES
Discharge: HOME | End: 2021-11-24

## 2021-11-24 NOTE — FLOWSHEET NOTE
The [address] [facility],[address], [address] [facility], [address] [place], [place]      Physical Therapy  Cancellation/No-show Note  Patient Name:  Livier Davila  :  1990   Date:  2021  Cancelled visits to date: 1  No-shows to date: 0    For today's appointment patient:  [x]  Cancelled  []  Rescheduled appointment  []  No-show     Reason given by patient:  [x]  Patient ill  []  Conflicting appointment  []  No transportation    []  Conflict with work  []  No reason given  []  Other:     Comments:      Electronically signed by:  Benny Magallanes, [address] [facility], DPT [id number]

## 2021-11-29 ENCOUNTER — HOSPITAL ENCOUNTER (OUTPATIENT)
Dept: PHYSICAL THERAPY | Age: 31
Setting detail: THERAPIES SERIES
Discharge: HOME | End: 2021-11-29
Payer: COMMERCIAL

## 2021-11-29 PROCEDURE — 97110 THERAPEUTIC EXERCISES: CPT | Performed by: PHYSICAL THERAPIST

## 2021-11-29 PROCEDURE — 97140 MANUAL THERAPY 1/> REGIONS: CPT | Performed by: PHYSICAL THERAPIST

## 2021-11-29 NOTE — FLOWSHEET NOTE
The [address] [facility],[address], [address] [facility], [address] [place], [place]    Physical Therapy Treatment Note/ Progress Report:           Date:  2021    Patient Name:  Jocelyn Zendejas    :  1990  MRN: [id number]  Restrictions/Precautions:    Medical/Treatment Diagnosis Information:  · Diagnosis: Right sciatic nerve pain (M54.31)  · Treatment Diagnosis: Low back pain (G28.9)  Insurance/Certification information:  PT Insurance Information: [facility]  Physician Information:  Referring Practitioner: Bradley Ford  Has the plan of care been signed (Y/N):        [x]  Yes  []  No     Date of Patient follow up with Physician:       Is this a Progress Report:     []  Yes  [x]  No        If Yes:  Date Range for reporting period:  Beginning: 10/27/2021  Ending    Progress report will be due (10 Rx or 30 days whichever is less):        Recertification will be due (POC Duration  / 90 days whichever is less):         Visit # Insurance Allowable Auth Required   In-person 6 BMN []  Yes []  No    Telehealth   []  Yes []  No    Total            Functional Scale: Modified Oswestry 8/50 (16%)    Date assessed:  10/27/2021      Therapy Diagnosis/Practice Pattern:F      Number of Comorbidities:  [x]0     []1-2    []3+    Latex Allergy:  [x]NO      []YES  Preferred Language for Healthcare:   [x]English       []other:      Pain level:  0/10    SUBJECTIVE:  Patient reports she is doing very well. No pain in her back the back since last week. Feels mild tightness in shoulder/upper back today. OBJECTIVE:    Observation: TTP at greater trochanter and just below, palpable tightness along IT band   Test measurements: PN NV    RESTRICTIONS/PRECAUTIONS:     Exercises/Interventions:   Consent signed 11/3/2021 and precautions addressed. See media tab.    Muscle  Needle Size Technique Notes IES   Site 1  .[id number] [x] Pistoning / []  Threading  []  Winding/Coning LTRs []    Site 2 0.1m687xi [x] Pistoning / []  Threading  [x]  Winding/Coning LTR [x]    Site 3 0.3x75mm [] Pistoning / []  Threading  [x]  Winding/Coning  []    Site 4 0.3x50mm [x] Pistoning / []  Threading  [x]  Winding/Coning LTR []    Site 5   [] Pistoning / []  Threading  []  Winding/Coning  []    Site 6   [] Pistoning / []  Threading  []  Winding/Coning  []    Site 7   [] Pistoning / []  Threading  []  Winding/Coning  []    Site 8   [] Pistoning / []  Threading  []  Winding/Coning  []    Site 9   [] Pistoning / []  Threading  []  Winding/Coning  []    Site 10   [] Pistoning / []  Threading  []  Winding/Coning  []            Therapeutic Ex (71638) Sets/sec Reps Notes/CUES   Patient ed 5'  Self massage, posture, hip vs lumbar pathology         Demo for HEP for thoracic mobility   Figure 4 stretch 30\" 3 ea B               alternating         alternating      SB bridging 3\" 20x    Incline stretch 30\" 3                Goblet squats 20# 3\" 20x    SL deadlift 3# 2 10x ea  Fingertip support for balance   Pallof press GTB x 2 1 15 ea B    Glider posterior reach 1 15x ea B    Manual Intervention (96793)          STM and TP release to gluteals and piriformis  4'      No cavitation noted    Cavitation noted   Thoracolumbar spine mobilizations Gr III/IV 6'     Massage stick to IT band 2'         TDN      NMR re-education (74710)   CUES NEEDED         Quadruped alternating bird dog 2 5 ea With foam roll on neutral spine for cueing         Anterior hip thrust in tall kneeling on BOSU with purple band 2 10                      Therapeutic Activity (50470)                                          HEP instruction:   Access Code: [email or web address]  URL: [email or web address]  Date: 10/27/2021  Prepared by: Rimma Gleason    Therapeutic Exercise and NMR EXR  [x] (30556) Provided verbal/tactile cueing for activities related to strengthening, flexibility, endurance, ROM  for improvements in proximal hip and core control with self care, mobility, lifting and ambulation.  [] (65459) Provided verbal/tactile cueing for activities related to improving balance, coordination, kinesthetic sense, posture, motor skill, proprioception  to assist with core control in self care, mobility, lifting, and ambulation. Therapeutic Activities:    [] (81052 or 23964) Provided verbal/tactile cueing for activities related to improving balance, coordination, kinesthetic sense, posture, motor skill, proprioception and motor activation to allow for proper function  with self care and ADLs  [] (75335) Provided training and instruction to the patient for proper core and proximal hip recruitment and positioning with ambulation re-education     Home Exercise Program:    [x] (85940) Reviewed/Progressed HEP activities related to strengthening, flexibility, endurance, ROM of core, proximal hip and LE for functional self-care, mobility, lifting and ambulation   [] (98592) Reviewed/Progressed HEP activities related to improving balance, coordination, kinesthetic sense, posture, motor skill, proprioception of core, proximal hip and LE for self care, mobility, lifting, and ambulation      Manual Treatments:  PROM / STM / Oscillations-Mobs:  G-I, II, III, IV (PA's, Inf., Post.)  [x] (57149) Provided manual therapy to mobilize proximal hip and LS spine soft tissue/joints for the purpose of modulating pain, promoting relaxation,  increasing ROM, reducing/eliminating soft tissue swelling/inflammation/restriction, improving soft tissue extensibility and allowing for proper ROM for normal function with self care, mobility, lifting and ambulation.      Modalities:   None this date    Trigger point Dry Needling:  fine needle insertion into myofascial trigger points to stimulate a healing response  [] (84583) Needle insertion without injection: 1 or 2 muscles  [] (57825) Needle insertion without injection: 3 or more muscles  Charges    Timed Code Treatment Minutes: 40'   Total Treatment Minutes: 36' [] EVAL (LOW) 13366   [] EVAL (MOD) 71519   [] EVAL (HIGH) 23193   [] RE-EVAL     [x] CX(31688) x  2  [] IONTO  [] NMR (81006) x   [] VASO  [x] Manual (04121) x  1    [] Other: TDN 1-2  [] TA x      [] Mech Traction (12230)  [] ES(attended) (47257)      [] ES (un) (02804):     GOALS:  Patient stated goal: \"Increase strength in back/hips\"  [] Progressing: [] Met: [] Not Met: [] Adjusted    Therapist goals for Patient:   Short Term Goals: To be achieved in: 2 weeks  1. Independent in HEP and progression per patient tolerance, in order to prevent re-injury. [] Progressing: [x] Met: [] Not Met: [] Adjusted  2. Patient will have a decrease in pain to facilitate improvement in movement, function, and ADLs as indicated by Functional Deficits. [] Progressing: [x] Met: [] Not Met: [] Adjusted      Long Term Goals: To be achieved in: 8 weeks  1. Disability index score of 8% or less for the Modified Oswestry to assist with reaching prior level of function. [] Progressing: [] Met: [] Not Met: [] Adjusted  2. Patient will demonstrate increased AROM of lumbar spine flexion and extension to WNL without pain to allow for proper joint functioning as indicated by patients Functional Deficits. [] Progressing: [] Met: [] Not Met: [] Adjusted  3. Patient will demonstrate an increase in Strength in B hip abduction, flexion, and ER to 4+/5 and good core activation to allow for proper functional mobility as indicated by patients Functional Deficits. [] Progressing: [] Met: [] Not Met: [] Adjusted  4. Patient will be able to sit for 2 hours without increased symptoms or restriction. [] Progressing: [x] Met: [] Not Met: [] Adjusted  5.  Patient will be able to lift 50# from floor to waist height without increased symptoms or restrictions. (patient specific functional goal)    [x] Progressing: [] Met: [] Not Met: [] Adjusted       Progression Towards Functional goals:  [x] Patient is progressing as expected towards functional goals listed. [] Progression is slowed due to complexities listed. [] Progression has been slowed due to co-morbidities. [] Plan just implemented, too soon to assess goals progression  [] Other:     Overall Progression Towards Functional goals/ Treatment Progress Update:  [x] Patient is progressing as expected towards functional goals listed. [] Progression is slowed due to complexities/Impairments listed. [] Progression has been slowed due to co-morbidities. [] Plan just implemented, too soon to assess goals progression <30days   [] Goals require adjustment due to lack of progress  [] Patient is not progressing as expected and requires additional follow up with physician  [] Other    Prognosis for POC: [x] Good [] Fair  [] Poor      Patient requires continued skilled intervention: [x] Yes  [] No    Treatment/Activity Tolerance:  [x] Patient able to complete treatment  [] Patient limited by fatigue  [] Patient limited by pain    [] Patient limited by other medical complications  [] Other:     ASSESSMENT:  Patient with much less tightness noted in gluteals and lumbar paraspinals. Responding well to PT. Will likely decrease to 1x/wk if still doing well NV. PLAN: See eval  [x] Continue per plan of care [] Alter current plan (see comments above)  [] Plan of care initiated [] Hold pending MD visit [] Discharge    Electronically signed by:  Shreyas Albarado PT, DPT [id number]     Note: If patient does not return for scheduled/ recommended follow up visits, this note will serve as a discharge from care along with most recent update on progress.

## 2021-12-08 ENCOUNTER — HOSPITAL ENCOUNTER (OUTPATIENT)
Dept: PHYSICAL THERAPY | Age: 31
Setting detail: THERAPIES SERIES
Discharge: HOME | End: 2021-12-08
Payer: COMMERCIAL

## 2021-12-08 PROCEDURE — 97140 MANUAL THERAPY 1/> REGIONS: CPT | Performed by: PHYSICAL THERAPIST

## 2021-12-08 PROCEDURE — 97112 NEUROMUSCULAR REEDUCATION: CPT | Performed by: PHYSICAL THERAPIST

## 2021-12-08 PROCEDURE — 97110 THERAPEUTIC EXERCISES: CPT | Performed by: PHYSICAL THERAPIST

## 2021-12-08 NOTE — PLAN OF CARE
The [address] [facility],[address], [address] [facility], [address] [place], [place]  Physical Therapy Re-Certification Plan of Lata Wade      Dear Dr. Elton Arenas  ,    We had the pleasure of treating the following patient for physical therapy services at [address] [place] [address]. A summary of our findings can be found in the updated assessment below. This includes our plan of care. If you have any questions or concerns regarding these findings, please do not hesitate to contact me at the office phone number checked above.   Thank you for the referral.     Physician Signature:________________________________Date:__________________  By signing above (or electronic signature), therapists plan is approved by physician    Date Range Of Visits: 10/27/2021 - 2021  Total Visits to Date: 7  Overall Response to Treatment:   [x]Patient is responding well to treatment and improvement is noted with regards  to goals   []Patient should continue to improve in reasonable time if they continue HEP   []Patient has plateaued and is no longer responding to skilled PT intervention    []Patient is getting worse and would benefit from return to referring MD   []Patient unable to adhere to initial POC   []Other:           Physical Therapy Treatment Note/ Progress Report:           Date:  2021    Patient Name:  Tamra Houston    :  1990  MRN: [id number]  Restrictions/Precautions:    Medical/Treatment Diagnosis Information:  · Diagnosis: Right sciatic nerve pain (M54.31)  · Treatment Diagnosis: Low back pain (Y27.6)  Insurance/Certification information:  PT Insurance Information: [facility]  Physician Information:  Referring Practitioner: Elton Arenas  Has the plan of care been signed (Y/N):        [x]  Yes  []  No     Date of Patient follow up with Physician:       Is this a Progress Report:     [x]  Yes (see above)  []  No        If Yes:  Date Range for reporting period:  Beginning:   Ending    Progress report will be due (10 Rx or 30 days whichever is less): 6/9/5619      Recertification will be due (POC Duration  / 90 days whichever is less):         Visit # Insurance Allowable Auth Required   In-person 7 BMN []  Yes []  No    Telehealth   []  Yes []  No    Total            Functional Scale: Modified Oswestry 8/50 (16%)    Date assessed:  12/8/2021      Therapy Diagnosis/Practice Pattern:F      Number of Comorbidities:  [x]0     []1-2    []3+    Latex Allergy:  [x]NO      []YES  Preferred Language for Healthcare:   [x]English       []other:      Pain level:  0/10    SUBJECTIVE:  Patient reports she continues to feel good. Not much pain at all in her hip or back. Has been back at the gym doing more exercise and it feels good. OBJECTIVE:    Observation: TTP at greater trochanter and just below, palpable tightness along IT band   Test measurements: AROM lumbar spine WNL without pain; MMT: right hip abduction 4+/5, left hip abduction 4/5, bilateral hip ER 4/5, bilateral hip flexion 4/5    RESTRICTIONS/PRECAUTIONS:     Exercises/Interventions:   Consent signed 11/3/2021 and precautions addressed. See media tab.    Muscle  Needle Size Technique Notes IES   Site 1  .[id number] [x] Pistoning / []  Threading  []  Winding/Coning LTRs []    Site 2 0.8n211ku [x] Pistoning / []  Threading  [x]  Winding/Coning LTR [x]    Site 3 0.3x75mm [] Pistoning / []  Threading  [x]  Winding/Coning  []    Site 4 0.3x50mm [x] Pistoning / []  Threading  [x]  Winding/Coning LTR []    Site 5   [] Pistoning / []  Threading  []  Winding/Coning  []    Site 6   [] Pistoning / []  Threading  []  Winding/Coning  []    Site 7   [] Pistoning / []  Threading  []  Winding/Coning  []    Site 8   [] Pistoning / []  Threading  []  Winding/Coning  []    Site 9   [] Pistoning / []  Threading  []  Winding/Coning  []    Site 10   [] Pistoning / []  Threading  []  Winding/Coning  []            Therapeutic Ex (50625) Sets/sec Reps Notes/CUES   Patient ed 5'  Self massage, posture, hip vs lumbar pathology         Demo for HEP for thoracic mobility   Figure 4 stretch 30\" 3 ea B               alternating         alternating      SB bridging  SB bridging with [address] 3\"  1 10x  15    Incline stretch 30\" 3                   SL deadlift 4# 2 10x ea  Fingertip support for balance   Pallof press GTB x 2 in 1/2 kneeling 1 15 ea B    Glider posterior, diagonal  reach 2 10x ea B    Manual Intervention (25249)          STM and TP release to gluteals and piriformis  5'      No cavitation noted    Cavitation noted   Thoracolumbar spine mobilizations Gr III/IV 4'     Massage stick to IT band 2'         TDN      NMR re-education (56816)   CUES NEEDED      1/2 kneeling TB ext with TA RTB 3\" 15x ea foot forward    Quadruped alternating bird dog 2 5 ea With foam roll on neutral spine for cueing         Anterior hip thrust in tall kneeling on BOSU with purple band 2 10                      Therapeutic Activity (44989)                                          HEP instruction:   Access Code: [email or web address]  URL: [email or web address]  Date: 10/27/2021  Prepared by: Chaz Knott    Therapeutic Exercise and NMR EXR  [x] (96780) Provided verbal/tactile cueing for activities related to strengthening, flexibility, endurance, ROM  for improvements in proximal hip and core control with self care, mobility, lifting and ambulation.  [] (24479) Provided verbal/tactile cueing for activities related to improving balance, coordination, kinesthetic sense, posture, motor skill, proprioception  to assist with core control in self care, mobility, lifting, and ambulation.      Therapeutic Activities:    [] (78193 or 86192) Provided verbal/tactile cueing for activities related to improving balance, coordination, kinesthetic sense, posture, motor skill, proprioception and motor activation to allow for proper function  with self care and ADLs  [] (92223) Provided training and instruction to the patient for proper core and proximal hip recruitment and positioning with ambulation re-education     Home Exercise Program:    [x] (50784) Reviewed/Progressed HEP activities related to strengthening, flexibility, endurance, ROM of core, proximal hip and LE for functional self-care, mobility, lifting and ambulation   [] (26038) Reviewed/Progressed HEP activities related to improving balance, coordination, kinesthetic sense, posture, motor skill, proprioception of core, proximal hip and LE for self care, mobility, lifting, and ambulation      Manual Treatments:  PROM / STM / Oscillations-Mobs:  G-I, II, III, IV (PA's, Inf., Post.)  [x] (30588) Provided manual therapy to mobilize proximal hip and LS spine soft tissue/joints for the purpose of modulating pain, promoting relaxation,  increasing ROM, reducing/eliminating soft tissue swelling/inflammation/restriction, improving soft tissue extensibility and allowing for proper ROM for normal function with self care, mobility, lifting and ambulation. Modalities:   None this date    Trigger point Dry Needling:  fine needle insertion into myofascial trigger points to stimulate a healing response  [] (18581) Needle insertion without injection: 1 or 2 muscles  [] (13022) Needle insertion without injection: 3 or more muscles  Charges    Timed Code Treatment Minutes: 40'   Total Treatment Minutes: 36'       [] EVAL (LOW) 36870   [] EVAL (MOD) 37407   [] EVAL (HIGH) 90148   [] RE-EVAL     [x] SS(44151) x  1  [] IONTO  [x] NMR (81267) x 1  [] VASO  [x] Manual (68098) x  1    [] Other: TDN 1-2  [] TA x      [] Mech Traction (28482)  [] ES(attended) (04326)      [] ES (un) (38057):     GOALS:  Patient stated goal: \"Increase strength in back/hips\"  [] Progressing: [] Met: [] Not Met: [] Adjusted    Therapist goals for Patient:   Short Term Goals: To be achieved in: 2 weeks  1.  Independent in HEP and progression per patient tolerance, in order to prevent re-injury. [] Progressing: [x] Met: [] Not Met: [] Adjusted  2. Patient will have a decrease in pain to facilitate improvement in movement, function, and ADLs as indicated by Functional Deficits. [] Progressing: [x] Met: [] Not Met: [] Adjusted      Long Term Goals: To be achieved in: 4 more weeks from 12/8/2021 (1/5/2021)  1. Disability index score of 8% or less for the Modified Oswestry to assist with reaching prior level of function. [] Progressing: [x] Met: [] Not Met: [] Adjusted  2. Patient will demonstrate increased AROM of lumbar spine flexion and extension to WNL without pain to allow for proper joint functioning as indicated by patients Functional Deficits. [] Progressing: [x] Met: [] Not Met: [] Adjusted  3. Patient will demonstrate an increase in Strength in B hip abduction, flexion, and ER to 4+/5 and good core activation to allow for proper functional mobility as indicated by patients Functional Deficits. [x] Progressing: [] Met: [] Not Met: [] Adjusted  4. Patient will be able to sit for 2 hours without increased symptoms or restriction. [] Progressing: [x] Met: [] Not Met: [] Adjusted  5. Patient will be able to lift 50# from floor to waist height without increased symptoms or restrictions. (patient specific functional goal)    [x] Progressing: [] Met: [] Not Met: [] Adjusted       Progression Towards Functional goals:  [x] Patient is progressing as expected towards functional goals listed. [] Progression is slowed due to complexities listed. [] Progression has been slowed due to co-morbidities. [] Plan just implemented, too soon to assess goals progression  [] Other:     Overall Progression Towards Functional goals/ Treatment Progress Update:  [x] Patient is progressing as expected towards functional goals listed. [] Progression is slowed due to complexities/Impairments listed. [] Progression has been slowed due to co-morbidities.   [] Plan just implemented, too soon to assess goals progression <30days   [] Goals require adjustment due to lack of progress  [] Patient is not progressing as expected and requires additional follow up with physician  [] Other    Prognosis for POC: [x] Good [] Fair  [] Poor      Patient requires continued skilled intervention: [x] Yes  [] No    Treatment/Activity Tolerance:  [x] Patient able to complete treatment  [] Patient limited by fatigue  [] Patient limited by pain    [] Patient limited by other medical complications  [] Other:     ASSESSMENT:  Patient demonstrates pain-free active ROM of lumbar spine WNL and improved hip and core strength since beginning PT. She is now pain-free with nearly all activities. Patient does demonstrate gluteal weakness and decreased endurance. Will benefit from continued skilled PT to address limitations. PLAN: See eval  [x] Continue per plan of care [] Alter current plan (see comments above)  [] Plan of care initiated [] Hold pending MD visit [] Discharge    Electronically signed by:  Erlin Heredia PT, DPT [id number]     Note: If patient does not return for scheduled/ recommended follow up visits, this note will serve as a discharge from care along with most recent update on progress.

## 2021-12-09 ENCOUNTER — OFFICE VISIT (OUTPATIENT)
Dept: PSYCHOLOGY | Age: 31
End: 2021-12-09
Payer: COMMERCIAL

## 2021-12-09 DIAGNOSIS — F41.9 ANXIETY: Primary | ICD-10-CM

## 2021-12-09 PROCEDURE — 90791 PSYCH DIAGNOSTIC EVALUATION: CPT | Performed by: PSYCHOLOGIST

## 2021-12-09 NOTE — PATIENT INSTRUCTIONS
1. Consider full dose Cognitive behavior therapy (CBT) psychotherapy down the road  2. Be supportive with self-care as anxiety arises, understanding how tied to normal grief reaction with your brother and father  3. Consider consultation with Dr Minerva Dillon about as needed panic medication  4.  Return to clinic for Dr Almeida in 2 months or sooner if needed

## 2021-12-09 NOTE — PROGRESS NOTES
Behavioral Health Consultation  Ana Harmon PsyD  Psychologist  2021  3:44 PM      Time spent with Patient: 60 minutes  This is patient's first  [facility] appointment. Reason for Consult:  Anxiety   Referring Provider: Ian Lara MD  [address]  [address],  [place]    Pt provided informed consent for the behavioral health program. Discussed with patient model of service to include the limits of confidentiality (i.e. abuse reporting, suicide intervention, etc.) and short-term intervention focused approach. Pt indicated understanding. Feedback given to PCP. S:    Presenting Problem (PP): anxiety     Problem hx: Pt here today at the recommendation of PCP. Struggling with health anxiety the last year. No extensive history of MH treatment outside brief grief counseling after the death of her father (60 years old) in 2017,  suddenly by cardiac issues. Older sister called pt and her brother (now ) to come home due to father's health issues. While flying home, pt met brother at airport to tell her that father had already passed. Sought grief counseling but also anxiety around flying in a plane. Last year anxiety has worsened, feels health anxiety is excessive, worried she is making too much out of this. Sparked deeper discussion about role grief playing in her anxiety especially now in the midst of brother (28years old)  in [place] surfing accident 2020. Mother was also COVID positive last year. Anxiety around     Trauma hx: none reported    Past psych tx: no medication, no CBT psychotherapy     Current psych med tx: none    Functional assessment/Typical day (how PP is affecting or being affected by. Madalyn Dey ):  Close relationships:  Dating relationship with gf for the last year, very supportive and feels \"I'm in a good place in my life\"    Work: doesn't feel work is a stressor    Psych ROS:      Depression: negative screen     Viv: DENIES insomnia with increased energy, rapid speech, easily distracted or decreased attention, irritability, racing thoughts, expansive mood, increase in energy and goal directed behavior, grandiosity, flight of ideas     Anxiety:  see ELKE-7 score below     OCD:  reported OCD runs in the family, mother has it, both sisters have it     Panic:  palpitations, tightness in the chest, can last for few minutes or hours     Psychosis: denies A/VH, or delusions    Substance abuse: none     PTSD:      PC-PTSD-5   Sometimes things happen to people that are unusually or especially frightening, horrible, or traumatic. For example:    a serious accident or fire    a physical or sexual assault or abuse    an earthquake or flood    a war    seeing someone be killed or seriously injured    having a loved one die through homicide or suicide. Have you ever experienced this kind of event? NO   If no, screen total = 0. Please stop here. NEGATIVE PTSD SCREEN    O:  MSE:    Appearance    alert, cooperative  Appetite normal  Sleep disturbance No  Fatigue No  Loss of pleasure No  Impulsive behavior No  Speech    normal rate, normal volume and well articulated  Mood    Anxiety but improving   Affect    normal affect  Thought Content    intact  Thought Process    linear, goal directed and coherent  Associations    logical connections  Insight    Good  Judgment    Intact  Orientation    oriented to person, place, time, and general circumstances  Memory    recent and remote memory intact  Attention/Concentration    intact  Morbid ideation No  Suicide Assessment    no suicidal ideation      History:    Medications:   No current outpatient medications on file. No current facility-administered medications for this visit.        Social History:   Social History     Socioeconomic History    Marital status: Single     Spouse name: Not on file    Number of children: Not on file    Years of education: Not on file    Highest education level: Not on file   Occupational History    Not on file   Tobacco Use    Smoking status: Never Smoker    Smokeless tobacco: Never Used   Vaping Use    Vaping Use: Never used   Substance and Sexual Activity    Alcohol use: Not Currently    Drug use: Yes     Types: Marijuana Amaurymoe Swenson)     Comment: medical marijuana    Sexual activity: Yes     Partners: Female   Other Topics Concern    Not on file   Social History Narrative    Not on file     Social Determinants of Health     Financial Resource Strain:     Difficulty of Paying Living Expenses: Not on file   Food Insecurity:     Worried About Running Out of Food in the Last Year: Not on file    Lisa of Food in the Last Year: Not on file   Transportation Needs:     Lack of Transportation (Medical): Not on file    Lack of Transportation (Non-Medical): Not on file   Physical Activity:     Days of Exercise per Week: Not on file    Minutes of Exercise per Session: Not on file   Stress:     Feeling of Stress : Not on file   Social Connections:     Frequency of Communication with Friends and Family: Not on file    Frequency of Social Gatherings with Friends and Family: Not on file    Attends [employer] Services: Not on file    Active Member of [address] [place] [address] or Organizations: Not on file    Attends Club or Organization Meetings: Not on file    Marital Status: Not on file   Intimate Partner Violence:     Fear of Current or Ex-Partner: Not on file    Emotionally Abused: Not on file    Physically Abused: Not on file    Sexually Abused: Not on file   Housing Stability:     Unable to Pay for Housing in the Last Year: Not on file    Number of [place] in the Last Year: Not on file    Unstable Housing in the Last Year: Not on file       TOBACCO:   reports that she has never smoked. She has never used smokeless tobacco.  ETOH:   reports previous alcohol use.     Family History:   Family History   Problem Relation Age of Onset    Heart Failure Father        A:    Pt presenting with what is suspected to be low lying chronic anxiety disorder in the ELKE and panic disorder spectrum with some ruminations about health/safety when anxiety is uncontrolled. There is a reported family history of OCD in the family but unclear if this is more of a family interpersonal style or a primary Axis I OCD anxiety issue. Provided psycho-ed about chronic nature of ELKE and panic including treatment options. How health anxiety tied to normal grief reaction since the death of her brother last year and father in 2017. Provided psycho-ed about grief symptoms and what to look for as anniversaries of both of their deaths approach in April and June. Discussed benefits of full dose psychotherapy. Agreed for now going to put that on hold, going to take a soft handed monitoring approach over the next few months. Recommended f/u with me in February just before the anniversaries. Suspect anxiety may flare up in the Spring. Agreed that if it does, will re-assess need for either consultation with PCP about medication or full dose psychotherapy referral.     Safety: Denied any si/hi risk, intent, or plan. No hx psych admissions. No hx suicide attempts/gestures. No hx substance abuse tx. Diagnosis:    ELKE, panic disorder   No past medical history on file. Other psychosocial and environmental problems    Plan:  Pt interventions:    Practiced assertive communication, Trained in strategies for increasing balanced thinking, Provided education, Discussed benefits of referral for specialty care, Established rapport, Conducted functional assessment, Supportive techniques and Provided Psychoeducation re: role grief playing in her anxiety right now. Pt Behavioral Change Plan:    1. Consider full dose Cognitive behavior therapy (CBT) psychotherapy down the road  2. Be supportive with self-care as anxiety arises, understanding how tied to normal grief reaction with your brother and father  3.  Consider consultation with Dr Paulino Mahan about as needed panic medication  4.  Return to clinic for Dr Eber Badillo in 2 months or sooner if needed

## 2021-12-15 ENCOUNTER — HOSPITAL ENCOUNTER (OUTPATIENT)
Dept: PHYSICAL THERAPY | Age: 31
Setting detail: THERAPIES SERIES
Discharge: HOME | End: 2021-12-15
Payer: COMMERCIAL

## 2021-12-15 PROCEDURE — 97112 NEUROMUSCULAR REEDUCATION: CPT | Performed by: PHYSICAL THERAPIST

## 2021-12-15 PROCEDURE — 97110 THERAPEUTIC EXERCISES: CPT | Performed by: PHYSICAL THERAPIST

## 2021-12-15 PROCEDURE — 97140 MANUAL THERAPY 1/> REGIONS: CPT | Performed by: PHYSICAL THERAPIST

## 2021-12-15 NOTE — FLOWSHEET NOTE
[facility]  Orthopaedics and Sports Rehabilitation, [address] [facility], [address] [facility]    Physical Therapy Treatment Note/ Progress Report:           Date:  12/15/2021    Patient Name:  Saran Baker    :  1990  MRN: [id number]  Restrictions/Precautions:    Medical/Treatment Diagnosis Information:  · Diagnosis: Right sciatic nerve pain (M54.31)  · Treatment Diagnosis: Low back pain (B59.5)  Insurance/Certification information:  PT Insurance Information: [id number]  Physician Information:  Referring Practitioner: Hunter Hobbs  Has the plan of care been signed (Y/N):        [x]  Yes  []  No     Date of Patient follow up with Physician:       Is this a Progress Report:     [x]  Yes (see above)  []  No        If Yes:  Date Range for reporting period:  Beginnin2021  Ending    Progress report will be due (10 Rx or 30 days whichever is less): 2454      Recertification will be due (POC Duration  / 90 days whichever is less):         Visit # Insurance Allowable Auth Required   In-person 8 BMN []  Yes []  No    Telehealth   []  Yes []  No    Total            Functional Scale: Modified Oswestry 0/50 (0%)    Date assessed:  2021      Therapy Diagnosis/Practice Pattern:F      Number of Comorbidities:  [x]0     []1-2    []3+    Latex Allergy:  [x]NO      []YES  Preferred Language for Healthcare:   [x]English       []other:      Pain level:  0/10    SUBJECTIVE:  Patient reports she gets some soreness if she sleeps on her stomach, but otherwise feeling good. Tightness in her mid back is also improving. OBJECTIVE:    Observation: TTP at greater trochanter and just below, palpable tightness along IT band   Test measurements: NT this date    RESTRICTIONS/PRECAUTIONS:     Exercises/Interventions:   Consent signed 11/3/2021 and precautions addressed. See media tab.    Muscle  Needle Size Technique Notes IES   Site 1  .44j128ph [x] Pistoning / []  Threading  []  Winding/Coning LTRs []    Site 2 0.1q407uj [x] Pistoning / []  Threading  [x]  Winding/Coning LTR [x]    Site 3 0.3x75mm [] Pistoning / []  Threading  [x]  Winding/Coning  []    Site 4 0.3x50mm [x] Pistoning / []  Threading  [x]  Winding/Coning LTR []    Site 5   [] Pistoning / []  Threading  []  Winding/Coning  []    Site 6   [] Pistoning / []  Threading  []  Winding/Coning  []    Site 7   [] Pistoning / []  Threading  []  Winding/Coning  []    Site 8   [] Pistoning / []  Threading  []  Winding/Coning  []    Site 9   [] Pistoning / []  Threading  []  Winding/Coning  []    Site 10   [] Pistoning / []  Threading  []  Winding/Coning  []            Therapeutic Ex (10772) Sets/sec Reps Notes/CUES   Patient ed 5'  Self massage, posture, hip vs lumbar pathology         Demo for HEP for thoracic mobility   Figure 4 stretch 30\" 3 ea B               alternating         alternating      SB bridging ecc  SB bridging with HSC 1  1 10x ea  10x    Incline stretch 30\" 3                   Deadlift 20# KB 2 10x    Pallof press GTB x 2 in 1/2 kneeling 1 15 ea B    Glider posterior, diagonal  reach 1 15x ea B    Manual Intervention (65507)          STM and TP release to gluteals and piriformis  5'      No cavitation noted    Cavitation noted mid T spine   Thoracolumbar spine mobilizations Gr III/IV 5'     Massage stick to IT band 2'         TDN      NMR re-education (14140)   CUES NEEDED      1/2 kneeling TB ext with TA RTB 3\" 15x ea foot forward    With foam roll on neutral spine for cueing            Side plank with clamshells OVL 3\" 2 x 10    Prone on BOSU bird-dogs 3\" 10x ea          Therapeutic Activity (09093)                                          HEP instruction:   Access Code: [email or web address]  URL: [email or web address]  Date: 10/27/2021  Prepared by: Artur Adams    Therapeutic Exercise and NMR EXR  [x] (55297) Provided verbal/tactile cueing for activities related to strengthening, flexibility, endurance, ROM  for improvements in proximal hip Minutes: 37'   Total Treatment Minutes: 37'       [] EVAL (LOW) 81098   [] EVAL (MOD) 01237   [] EVAL (HIGH) 34755   [] RE-EVAL     [x] NW(86582) x  1  [] IONTO  [x] NMR (28095) x 1  [] VASO  [x] Manual (70189) x  1    [] Other: TDN 1-2  [] TA x      [] Mech Traction (21115)  [] ES(attended) (91916)      [] ES (un) (44715):     GOALS:  Patient stated goal: \"Increase strength in back/hips\"  [] Progressing: [] Met: [] Not Met: [] Adjusted    Therapist goals for Patient:   Short Term Goals: To be achieved in: 2 weeks  1. Independent in HEP and progression per patient tolerance, in order to prevent re-injury. [] Progressing: [x] Met: [] Not Met: [] Adjusted  2. Patient will have a decrease in pain to facilitate improvement in movement, function, and ADLs as indicated by Functional Deficits. [] Progressing: [x] Met: [] Not Met: [] Adjusted      Long Term Goals: To be achieved in: 4 more weeks from 12/8/2021 (1/5/2021)  1. Disability index score of 8% or less for the Modified Oswestry to assist with reaching prior level of function. [] Progressing: [x] Met: [] Not Met: [] Adjusted  2. Patient will demonstrate increased AROM of lumbar spine flexion and extension to WNL without pain to allow for proper joint functioning as indicated by patients Functional Deficits. [] Progressing: [x] Met: [] Not Met: [] Adjusted  3. Patient will demonstrate an increase in Strength in B hip abduction, flexion, and ER to 4+/5 and good core activation to allow for proper functional mobility as indicated by patients Functional Deficits. [x] Progressing: [] Met: [] Not Met: [] Adjusted  4. Patient will be able to sit for 2 hours without increased symptoms or restriction. [] Progressing: [x] Met: [] Not Met: [] Adjusted  5.  Patient will be able to lift 50# from floor to waist height without increased symptoms or restrictions. (patient specific functional goal)    [x] Progressing: [] Met: [] Not Met: [] Adjusted       Progression Towards Functional goals:  [x] Patient is progressing as expected towards functional goals listed. [] Progression is slowed due to complexities listed. [] Progression has been slowed due to co-morbidities. [] Plan just implemented, too soon to assess goals progression  [] Other:     Overall Progression Towards Functional goals/ Treatment Progress Update:  [x] Patient is progressing as expected towards functional goals listed. [] Progression is slowed due to complexities/Impairments listed. [] Progression has been slowed due to co-morbidities. [] Plan just implemented, too soon to assess goals progression <30days   [] Goals require adjustment due to lack of progress  [] Patient is not progressing as expected and requires additional follow up with physician  [] Other    Prognosis for POC: [x] Good [] Fair  [] Poor      Patient requires continued skilled intervention: [x] Yes  [] No    Treatment/Activity Tolerance:  [x] Patient able to complete treatment  [] Patient limited by fatigue  [] Patient limited by pain    [] Patient limited by other medical complications  [] Other:     ASSESSMENT:  Patient challenged by deadlifts with KB, with cueing needed for form. Progressing well with higher level strengthening. Able to perform work duties without pain. Progressing well. PLAN: See eval  [x] Continue per plan of care [] Alter current plan (see comments above)  [] Plan of care initiated [] Hold pending MD visit [] Discharge    Electronically signed by:  Janna Ashley PT, DPT [id number]     Note: If patient does not return for scheduled/ recommended follow up visits, this note will serve as a discharge from care along with most recent update on progress.

## 2021-12-16 DIAGNOSIS — R30.0 DYSURIA: Primary | ICD-10-CM

## 2021-12-16 RX ORDER — NITROFURANTOIN 25; 75 MG/1; MG/1
100 CAPSULE ORAL 2 TIMES DAILY
Qty: 14 CAPSULE | Refills: 0 | Status: CANCELLED | OUTPATIENT
Start: 2021-12-16 | End: 2021-12-23

## 2021-12-22 ENCOUNTER — HOSPITAL ENCOUNTER (OUTPATIENT)
Dept: PHYSICAL THERAPY | Age: 31
Setting detail: THERAPIES SERIES
Discharge: HOME | End: 2021-12-22

## 2021-12-22 NOTE — FLOWSHEET NOTE
[facility], [address], [address] [place], [place]      Physical Therapy  Cancellation/No-show Note  Patient Name:  Arlene Stein  :  1990   Date:  2021  Cancelled visits to date: 2  No-shows to date: 0    For today's appointment patient:  [x]  Cancelled  []  Rescheduled appointment  []  No-show     Reason given by patient:  []  Patient ill  [x]  Conflicting appointment  []  No transportation    []  Conflict with work  []  No reason given  []  Other:     Comments:      Electronically signed by:  Katharina Corral, DPT [id number]

## 2021-12-29 ENCOUNTER — HOSPITAL ENCOUNTER (OUTPATIENT)
Dept: PHYSICAL THERAPY | Age: 31
Setting detail: THERAPIES SERIES
Discharge: HOME | End: 2021-12-29
Payer: COMMERCIAL

## 2021-12-29 PROCEDURE — 97140 MANUAL THERAPY 1/> REGIONS: CPT | Performed by: PHYSICAL THERAPIST

## 2021-12-29 PROCEDURE — 97110 THERAPEUTIC EXERCISES: CPT | Performed by: PHYSICAL THERAPIST

## 2021-12-29 PROCEDURE — 97112 NEUROMUSCULAR REEDUCATION: CPT | Performed by: PHYSICAL THERAPIST

## 2021-12-29 NOTE — DISCHARGE SUMMARY
[facility]  Orthopaedics and Sports Rehabilitation, [address] [facility], [address] [place], [place]    Physical Therapy Discharge  Date: 2021        Patient Name:  Maribeth Mata    :  1990  MRN: [id number]  Referring Physician: Paulino Mahan  · Diagnosis: Right sciatic nerve pain                         ICD Code: M54.31  [] Surgical [x] Conservative   Therapy Diagnosis/Practice Pattern: D      Number of Comorbidities:  [x]0     []1-2    []3+  Total number of visits: 9   Reporting Period:   Beginning Date:10/27/2021   End Date:2021    OBJECTIVE  Test used Initial score Discharge Score   Pain Summary 0-10 3-8 0   Functional questionnaire Modified Oswetsry 16% 0%   Functional Testing            ROM Lumbar flexion 55 WNL    Lumbar ext 15 WNL   Strength R hip flexion 4/5 4+/5    L hip flexion 4/5 4+/5    R hip abd 4/5 4+/5    L hip abd 4-/5 4+/5    R hip ER 4-/5 4+/5    L hip ER 4/5 4+/5          Treatment to date:  [x] Therapeutic Exercise    [x] Modalities:  [] Therapeutic Activity             []Ultrasound            []Electrical Stimulation  [] Gait Training     []Cervical Traction    [] Lumbar Traction  [x] Neuromuscular Re-education [x] Cold/hotpack         []Iontophoresis  [x] Instruction in HEP      Other: TDN  [x] Manual Therapy                   [x]                                  []   Assessment:   [x] All Goals were achieved.    [] The following goals were achieved (#'s):   [] The following goals were not achieved for the following reasons:/assessmen of improvement as it relates to each goal:    Plan of Care:  [x] Discharge from Therapy Services due to:    Reason for Discharge:   [x] All goals achieved    [] Patient having surgery  [] Physician discontinued therapy  [] Insurance/Financial Limitations [] Patient did not return for follow up visits [] Home program/1 visit only   [] No subjective or objective improvement [] Plateaued   [] Patient was unable to adhere to the plan of care established at evaluation. [] Referred back to physician for re-evaluation and did not return.      [] Other:       Electronically signed by:  Katharina Vargas, DPT [id number]

## 2021-12-29 NOTE — FLOWSHEET NOTE
The [facility] [employer]  Orthopaedics and Sports Rehabilitation, [address] [facility], [address] [place], [place]    Physical Therapy Treatment Note/ Progress Report:           Date:  2021    Patient Name:  Gayathri Lagunas    :  1990  MRN: [id number]  Restrictions/Precautions:    Medical/Treatment Diagnosis Information:  · Diagnosis: Right sciatic nerve pain (M54.31)  · Treatment Diagnosis: Low back pain (F79.1)  Insurance/Certification information:  PT Insurance Information: [id number] [facility]  Physician Information:  Referring Practitioner: Rakan Monsalve  Has the plan of care been signed (Y/N):        [x]  Yes  []  No     Date of Patient follow up with Physician:       Is this a Progress Report:     [x]  Yes (see above)  []  No        If Yes:  Date Range for reporting period:  Beginnin2021  Ending    Progress report will be due (10 Rx or 30 days whichever is less): 9379      Recertification will be due (POC Duration  / 90 days whichever is less):         Visit # Insurance Allowable Auth Required   In-person 9 BMN []  Yes []  No    Telehealth   []  Yes []  No    Total            Functional Scale: Modified Oswestry 0/50 (0%)    Date assessed:  2021      Therapy Diagnosis/Practice Pattern:F      Number of Comorbidities:  [x]0     []1-2    []3+    Latex Allergy:  [x]NO      []YES  Preferred Language for Healthcare:   [x]English       []other:      Pain level:  0/10    SUBJECTIVE:  Patient reports she is feeling good. Did a lot of moving and lifting and was a little sore last night, but felt good today.  Happy with her results from PT.     OBJECTIVE:    Observation: TTP at greater trochanter and just below, palpable tightness along IT band  · Test measurements: AROM lumbar spine WNL without pain; MMT: right hip abduction 4+/5, left hip abduction 4+/5, bilateral hip ER 4+/5, bilateral hip flexion 4+/5       RESTRICTIONS/PRECAUTIONS:     Exercises/Interventions:   Consent signed 11/3/2021 and precautions addressed. See media tab. Muscle  Needle Size Technique Notes IES   Site 1  .[id number] [x] Pistoning / []  Threading  []  Winding/Coning LTRs []    Site 2 [id number] [x] Pistoning / []  Threading  [x]  Winding/Coning LTR [x]    Site 3 0.3x75mm [] Pistoning / []  Threading  [x]  Winding/Coning  []    Site 4 0.3x50mm [x] Pistoning / []  Threading  [x]  Winding/Coning LTR []    Site 5   [] Pistoning / []  Threading  []  Winding/Coning  []    Site 6   [] Pistoning / []  Threading  []  Winding/Coning  []    Site 7   [] Pistoning / []  Threading  []  Winding/Coning  []    Site 8   [] Pistoning / []  Threading  []  Winding/Coning  []    Site 9   [] Pistoning / []  Threading  []  Winding/Coning  []    Site 10   [] Pistoning / []  Threading  []  Winding/Coning  []            Therapeutic Ex (34302) Sets/sec Reps Notes/CUES   Patient ed 5'  Self massage, posture, hip vs lumbar pathology         Demo for HEP for thoracic mobility   Figure 4 stretch 30\" 3 ea B               alternating         alternating      SB bridging ecc  SB bridging with HSC 1  1 15x ea  15x    Incline stretch 30\" 3                Goblet squats 25# 3\" 20x    Deadlift 25# KB 2 10x    Pallof press GTB x 2 in 1/2 kneeling 1 15 ea B    Glider posterior, diagonal  reach 1 15x ea B    Manual Intervention (10950)          STM and TP release to gluteals and piriformis  5'      No cavitation noted    Cavitation noted mid T spine   Thoracolumbar spine mobilizations Gr III/IV 5'     Massage stick to IT band 2'         TDN      NMR re-education (47382)   CUES NEEDED      1/2 kneeling TB ext with TA GTB 3\" 10x ea foot forward    With foam roll on neutral spine for cueing               Prone on BOSU bird-dogs 3\" 10x ea    Steamboats 2 20x ea B    Therapeutic Activity (78007)                                          HEP instruction:   Access Code: [email or web address]  URL: [email or web address]  Date: 10/27/2021  Prepared by: Hong Alfred    Therapeutic Exercise and NMR EXR  [x] (24833) Provided verbal/tactile cueing for activities related to strengthening, flexibility, endurance, ROM  for improvements in proximal hip and core control with self care, mobility, lifting and ambulation.  [] (74914) Provided verbal/tactile cueing for activities related to improving balance, coordination, kinesthetic sense, posture, motor skill, proprioception  to assist with core control in self care, mobility, lifting, and ambulation. Therapeutic Activities:    [] (82391 or 28601) Provided verbal/tactile cueing for activities related to improving balance, coordination, kinesthetic sense, posture, motor skill, proprioception and motor activation to allow for proper function  with self care and ADLs  [] (40282) Provided training and instruction to the patient for proper core and proximal hip recruitment and positioning with ambulation re-education     Home Exercise Program:    [x] (57733) Reviewed/Progressed HEP activities related to strengthening, flexibility, endurance, ROM of core, proximal hip and LE for functional self-care, mobility, lifting and ambulation   [] (89721) Reviewed/Progressed HEP activities related to improving balance, coordination, kinesthetic sense, posture, motor skill, proprioception of core, proximal hip and LE for self care, mobility, lifting, and ambulation      Manual Treatments:  PROM / STM / Oscillations-Mobs:  G-I, II, III, IV (PA's, Inf., Post.)  [x] (61225) Provided manual therapy to mobilize proximal hip and LS spine soft tissue/joints for the purpose of modulating pain, promoting relaxation,  increasing ROM, reducing/eliminating soft tissue swelling/inflammation/restriction, improving soft tissue extensibility and allowing for proper ROM for normal function with self care, mobility, lifting and ambulation.      Modalities:   None this date    Trigger point Dry Needling:  fine needle insertion into myofascial trigger points to stimulate a healing response  [] (08355) Needle insertion without injection: 1 or 2 muscles  [] (72165) Needle insertion without injection: 3 or more muscles  Charges    Timed Code Treatment Minutes: 40'   Total Treatment Minutes: 36'       [] EVAL (LOW) 91362   [] EVAL (MOD) 13735   [] EVAL (HIGH) 91378   [] RE-EVAL     [x] IW(75140) x  1  [] IONTO  [x] NMR (40526) x 1  [] VASO  [x] Manual (50590) x  1    [] Other: TDN 1-2  [] TA x      [] Mech Traction (03494)  [] ES(attended) (12336)      [] ES (un) (84344):     GOALS:  Patient stated goal: \"Increase strength in back/hips\"  [] Progressing: [] Met: [] Not Met: [] Adjusted    Therapist goals for Patient:   Short Term Goals: To be achieved in: 2 weeks  1. Independent in HEP and progression per patient tolerance, in order to prevent re-injury. [] Progressing: [x] Met: [] Not Met: [] Adjusted  2. Patient will have a decrease in pain to facilitate improvement in movement, function, and ADLs as indicated by Functional Deficits. [] Progressing: [x] Met: [] Not Met: [] Adjusted      Long Term Goals: To be achieved in: 4 more weeks from 12/8/2021 (1/5/2021)  1. Disability index score of 8% or less for the Modified Oswestry to assist with reaching prior level of function. [] Progressing: [x] Met: [] Not Met: [] Adjusted  2. Patient will demonstrate increased AROM of lumbar spine flexion and extension to WNL without pain to allow for proper joint functioning as indicated by patients Functional Deficits. [] Progressing: [x] Met: [] Not Met: [] Adjusted  3. Patient will demonstrate an increase in Strength in B hip abduction, flexion, and ER to 4+/5 and good core activation to allow for proper functional mobility as indicated by patients Functional Deficits. [] Progressing: [x] Met: [] Not Met: [] Adjusted  4. Patient will be able to sit for 2 hours without increased symptoms or restriction. [] Progressing: [x] Met: [] Not Met: [] Adjusted  5.  Patient will be able to lift 50# from floor to waist height without increased symptoms or restrictions. (patient specific functional goal)    [] Progressing: [x] Met: [] Not Met: [] Adjusted       Progression Towards Functional goals:  [x] Patient is progressing as expected towards functional goals listed. [] Progression is slowed due to complexities listed. [] Progression has been slowed due to co-morbidities. [] Plan just implemented, too soon to assess goals progression  [] Other:     Overall Progression Towards Functional goals/ Treatment Progress Update:  [x] Patient is progressing as expected towards functional goals listed. [] Progression is slowed due to complexities/Impairments listed. [] Progression has been slowed due to co-morbidities. [] Plan just implemented, too soon to assess goals progression <30days   [] Goals require adjustment due to lack of progress  [] Patient is not progressing as expected and requires additional follow up with physician  [] Other    Prognosis for POC: [x] Good [] Fair  [] Poor      Patient requires continued skilled intervention: [x] Yes  [] No    Treatment/Activity Tolerance:  [x] Patient able to complete treatment  [] Patient limited by fatigue  [] Patient limited by pain    [] Patient limited by other medical complications  [] Other:     ASSESSMENT:  Patient has met all goals set during PT. Is appropriate for DC with HEP at this time. PLAN: See eval  [] Continue per plan of care [] Alter current plan (see comments above)  [] Plan of care initiated [] Hold pending MD visit [x] Discharge    Electronically signed by:  Anita Tom, PT, DPT [id number]     Note: If patient does not return for scheduled/ recommended follow up visits, this note will serve as a discharge from care along with most recent update on progress.